# Patient Record
Sex: MALE | Race: WHITE | NOT HISPANIC OR LATINO | Employment: FULL TIME | URBAN - NONMETROPOLITAN AREA
[De-identification: names, ages, dates, MRNs, and addresses within clinical notes are randomized per-mention and may not be internally consistent; named-entity substitution may affect disease eponyms.]

---

## 2017-03-24 RX ORDER — RANITIDINE 150 MG/1
150 CAPSULE ORAL 2 TIMES DAILY
Qty: 60 CAPSULE | Refills: 2 | OUTPATIENT
Start: 2017-03-24 | End: 2017-05-31 | Stop reason: SDUPTHER

## 2017-04-28 ENCOUNTER — HOSPITAL ENCOUNTER (OUTPATIENT)
Dept: ULTRASOUND IMAGING | Facility: HOSPITAL | Age: 57
Discharge: HOME | End: 2017-04-28
Admitting: NURSE PRACTITIONER

## 2017-04-28 ENCOUNTER — TRANSCRIBE ORDERS (OUTPATIENT)
Dept: ADMINISTRATIVE | Facility: HOSPITAL | Age: 57
End: 2017-04-28

## 2017-04-28 DIAGNOSIS — R60.1 GENERALIZED EDEMA: ICD-10-CM

## 2017-04-28 DIAGNOSIS — R60.1 GENERALIZED EDEMA: Primary | ICD-10-CM

## 2017-04-28 PROCEDURE — 93971 EXTREMITY STUDY: CPT | Performed by: RADIOLOGY

## 2017-04-28 PROCEDURE — 93971 EXTREMITY STUDY: CPT

## 2017-05-01 ENCOUNTER — OFFICE VISIT (OUTPATIENT)
Dept: CARDIOLOGY | Facility: CLINIC | Age: 57
End: 2017-05-01

## 2017-05-01 VITALS
WEIGHT: 215 LBS | HEART RATE: 61 BPM | BODY MASS INDEX: 30.78 KG/M2 | HEIGHT: 70 IN | SYSTOLIC BLOOD PRESSURE: 106 MMHG | DIASTOLIC BLOOD PRESSURE: 66 MMHG | RESPIRATION RATE: 16 BRPM

## 2017-05-01 DIAGNOSIS — I25.10 ASCVD (ARTERIOSCLEROTIC CARDIOVASCULAR DISEASE): Primary | ICD-10-CM

## 2017-05-01 DIAGNOSIS — E78.5 DYSLIPIDEMIA: ICD-10-CM

## 2017-05-01 DIAGNOSIS — E11.9 TYPE 2 DIABETES MELLITUS WITHOUT COMPLICATION, WITHOUT LONG-TERM CURRENT USE OF INSULIN (HCC): ICD-10-CM

## 2017-05-01 PROCEDURE — 93000 ELECTROCARDIOGRAM COMPLETE: CPT | Performed by: PHYSICIAN ASSISTANT

## 2017-05-01 PROCEDURE — 99213 OFFICE O/P EST LOW 20 MIN: CPT | Performed by: PHYSICIAN ASSISTANT

## 2017-05-01 RX ORDER — GLIPIZIDE 5 MG/1
5 TABLET ORAL
COMMUNITY

## 2017-05-30 RX ORDER — CLOPIDOGREL BISULFATE 75 MG/1
75 TABLET ORAL DAILY
Qty: 30 TABLET | Refills: 5 | Status: SHIPPED | OUTPATIENT
Start: 2017-05-30 | End: 2018-01-09 | Stop reason: SDUPTHER

## 2017-05-31 RX ORDER — RANITIDINE 150 MG/1
150 CAPSULE ORAL 2 TIMES DAILY
Qty: 60 CAPSULE | Refills: 2 | OUTPATIENT
Start: 2017-05-31 | End: 2017-07-20 | Stop reason: SDUPTHER

## 2017-06-12 ENCOUNTER — LAB (OUTPATIENT)
Dept: LAB | Facility: HOSPITAL | Age: 57
End: 2017-06-12

## 2017-06-12 DIAGNOSIS — E78.5 DYSLIPIDEMIA: ICD-10-CM

## 2017-06-12 LAB
ALBUMIN SERPL-MCNC: 4.3 G/DL (ref 3.5–5)
ALBUMIN/GLOB SERPL: 1.4 G/DL (ref 1.5–2.5)
ALP SERPL-CCNC: 113 U/L (ref 40–129)
ALT SERPL W P-5'-P-CCNC: 23 U/L (ref 10–44)
ANION GAP SERPL CALCULATED.3IONS-SCNC: 5.3 MMOL/L (ref 3.6–11.2)
AST SERPL-CCNC: 19 U/L (ref 10–34)
BILIRUB SERPL-MCNC: 0.3 MG/DL (ref 0.2–1.8)
BUN BLD-MCNC: 12 MG/DL (ref 7–21)
BUN/CREAT SERPL: 15 (ref 7–25)
CALCIUM SPEC-SCNC: 9.4 MG/DL (ref 7.7–10)
CHLORIDE SERPL-SCNC: 106 MMOL/L (ref 99–112)
CHOLEST SERPL-MCNC: 104 MG/DL (ref 0–200)
CO2 SERPL-SCNC: 25.7 MMOL/L (ref 24.3–31.9)
CREAT BLD-MCNC: 0.8 MG/DL (ref 0.43–1.29)
GFR SERPL CREATININE-BSD FRML MDRD: 101 ML/MIN/1.73
GLOBULIN UR ELPH-MCNC: 3.1 GM/DL
GLUCOSE BLD-MCNC: 200 MG/DL (ref 70–110)
HDLC SERPL-MCNC: 33 MG/DL (ref 60–100)
LDLC SERPL CALC-MCNC: 39 MG/DL (ref 0–100)
LDLC/HDLC SERPL: 1.17 {RATIO}
OSMOLALITY SERPL CALC.SUM OF ELEC: 279.2 MOSM/KG (ref 273–305)
POTASSIUM BLD-SCNC: 4.4 MMOL/L (ref 3.5–5.3)
PROT SERPL-MCNC: 7.4 G/DL (ref 6–8)
SODIUM BLD-SCNC: 137 MMOL/L (ref 135–153)
TRIGL SERPL-MCNC: 162 MG/DL (ref 0–150)
VLDLC SERPL-MCNC: 32.4 MG/DL

## 2017-06-12 PROCEDURE — 36415 COLL VENOUS BLD VENIPUNCTURE: CPT

## 2017-06-12 PROCEDURE — 80061 LIPID PANEL: CPT | Performed by: PHYSICIAN ASSISTANT

## 2017-06-12 PROCEDURE — 80053 COMPREHEN METABOLIC PANEL: CPT | Performed by: PHYSICIAN ASSISTANT

## 2017-07-20 RX ORDER — RANITIDINE 150 MG/1
150 CAPSULE ORAL 2 TIMES DAILY
Qty: 60 CAPSULE | Refills: 2 | OUTPATIENT
Start: 2017-07-20 | End: 2017-07-24 | Stop reason: SDUPTHER

## 2017-07-24 ENCOUNTER — TELEPHONE (OUTPATIENT)
Dept: CARDIOLOGY | Facility: CLINIC | Age: 57
End: 2017-07-24

## 2017-07-24 RX ORDER — LISINOPRIL 5 MG/1
5 TABLET ORAL DAILY
Qty: 30 TABLET | Refills: 4 | Status: SHIPPED | OUTPATIENT
Start: 2017-07-24 | End: 2018-01-09 | Stop reason: SDUPTHER

## 2017-07-24 RX ORDER — RANITIDINE 150 MG/1
150 CAPSULE ORAL 2 TIMES DAILY
Qty: 60 CAPSULE | Refills: 4 | OUTPATIENT
Start: 2017-07-24 | End: 2017-07-26 | Stop reason: SDUPTHER

## 2017-07-26 RX ORDER — RANITIDINE 150 MG/1
150 CAPSULE ORAL 2 TIMES DAILY
Qty: 60 CAPSULE | Refills: 4 | OUTPATIENT
Start: 2017-07-26 | End: 2018-04-18 | Stop reason: SDUPTHER

## 2017-08-01 ENCOUNTER — TELEPHONE (OUTPATIENT)
Dept: CARDIOLOGY | Facility: CLINIC | Age: 57
End: 2017-08-01

## 2017-08-01 NOTE — TELEPHONE ENCOUNTER
Pt's wife called and stated that she has been trying to get Yonatan Zantac called in but the pharmacy has stated that they didn't receive it. I advised her that we had sent it in 2 times. I advised her that I will call the pharmacy and call them. She expressed understanding.   Called and spoke with Garrison called in Zantac 150 mg BID #6o with 4 RFS.

## 2017-10-27 ENCOUNTER — HOSPITAL ENCOUNTER (EMERGENCY)
Facility: HOSPITAL | Age: 57
Discharge: HOME | End: 2017-10-27
Attending: EMERGENCY MEDICINE | Admitting: EMERGENCY MEDICINE

## 2017-10-27 ENCOUNTER — APPOINTMENT (OUTPATIENT)
Dept: CT IMAGING | Facility: HOSPITAL | Age: 57
End: 2017-10-27

## 2017-10-27 VITALS
WEIGHT: 230 LBS | BODY MASS INDEX: 32.93 KG/M2 | TEMPERATURE: 98.7 F | RESPIRATION RATE: 16 BRPM | SYSTOLIC BLOOD PRESSURE: 105 MMHG | OXYGEN SATURATION: 96 % | DIASTOLIC BLOOD PRESSURE: 67 MMHG | HEART RATE: 77 BPM | HEIGHT: 70 IN

## 2017-10-27 DIAGNOSIS — K62.89 RECTAL MASS: Primary | ICD-10-CM

## 2017-10-27 DIAGNOSIS — N28.89 RIGHT RENAL MASS: ICD-10-CM

## 2017-10-27 LAB
ALBUMIN SERPL-MCNC: 4.7 G/DL (ref 3.5–5)
ALBUMIN/GLOB SERPL: 1.5 G/DL (ref 1.5–2.5)
ALP SERPL-CCNC: 100 U/L (ref 40–129)
ALT SERPL W P-5'-P-CCNC: 27 U/L (ref 10–44)
AMYLASE SERPL-CCNC: 27 U/L (ref 28–100)
ANION GAP SERPL CALCULATED.3IONS-SCNC: 5.8 MMOL/L (ref 3.6–11.2)
AST SERPL-CCNC: 19 U/L (ref 10–34)
BACTERIA UR QL AUTO: NORMAL /HPF
BILIRUB SERPL-MCNC: 0.5 MG/DL (ref 0.2–1.8)
BILIRUB UR QL STRIP: NEGATIVE
BUN BLD-MCNC: 17 MG/DL (ref 7–21)
BUN/CREAT SERPL: 18.9 (ref 7–25)
CALCIUM SPEC-SCNC: 9.7 MG/DL (ref 7.7–10)
CHLORIDE SERPL-SCNC: 103 MMOL/L (ref 99–112)
CLARITY UR: CLEAR
CO2 SERPL-SCNC: 28.2 MMOL/L (ref 24.3–31.9)
COLOR UR: ABNORMAL
CREAT BLD-MCNC: 0.9 MG/DL (ref 0.43–1.29)
DEPRECATED RDW RBC AUTO: 42 FL (ref 37–54)
EOSINOPHIL # BLD MANUAL: 0.13 10*3/MM3 (ref 0–0.7)
EOSINOPHIL NFR BLD MANUAL: 1 % (ref 0–5)
ERYTHROCYTE [DISTWIDTH] IN BLOOD BY AUTOMATED COUNT: 12.4 % (ref 11.5–14.5)
GFR SERPL CREATININE-BSD FRML MDRD: 88 ML/MIN/1.73
GLOBULIN UR ELPH-MCNC: 3.2 GM/DL
GLUCOSE BLD-MCNC: 165 MG/DL (ref 70–110)
GLUCOSE UR STRIP-MCNC: ABNORMAL MG/DL
HCT VFR BLD AUTO: 40 % (ref 42–52)
HGB BLD-MCNC: 13.7 G/DL (ref 14–18)
HGB UR QL STRIP.AUTO: NEGATIVE
HYALINE CASTS UR QL AUTO: NORMAL /LPF
KETONES UR QL STRIP: ABNORMAL
LEUKOCYTE ESTERASE UR QL STRIP.AUTO: ABNORMAL
LIPASE SERPL-CCNC: 35 U/L (ref 13–60)
LYMPHOCYTES # BLD MANUAL: 4.71 10*3/MM3 (ref 1–3)
LYMPHOCYTES NFR BLD MANUAL: 37 % (ref 21–51)
LYMPHOCYTES NFR BLD MANUAL: 4 % (ref 0–10)
MCH RBC QN AUTO: 31.5 PG (ref 27–33)
MCHC RBC AUTO-ENTMCNC: 34.3 G/DL (ref 33–37)
MCV RBC AUTO: 92 FL (ref 80–94)
MONOCYTES # BLD AUTO: 0.51 10*3/MM3 (ref 0.1–0.9)
NEUTROPHILS # BLD AUTO: 7.39 10*3/MM3 (ref 1.4–6.5)
NEUTROPHILS NFR BLD MANUAL: 58 % (ref 30–70)
NITRITE UR QL STRIP: NEGATIVE
OSMOLALITY SERPL CALC.SUM OF ELEC: 279.1 MOSM/KG (ref 273–305)
PH UR STRIP.AUTO: <=5 [PH] (ref 5–8)
PLAT MORPH BLD: NORMAL
PLATELET # BLD AUTO: 291 10*3/MM3 (ref 130–400)
PMV BLD AUTO: 9.3 FL (ref 6–10)
POTASSIUM BLD-SCNC: 4.1 MMOL/L (ref 3.5–5.3)
PROT SERPL-MCNC: 7.9 G/DL (ref 6–8)
PROT UR QL STRIP: NEGATIVE
RBC # BLD AUTO: 4.35 10*6/MM3 (ref 4.7–6.1)
RBC # UR: NORMAL /HPF
RBC MORPH BLD: NORMAL
REF LAB TEST METHOD: NORMAL
SCAN SLIDE: NORMAL
SODIUM BLD-SCNC: 137 MMOL/L (ref 135–153)
SP GR UR STRIP: >=1.03 (ref 1–1.03)
SQUAMOUS #/AREA URNS HPF: NORMAL /HPF
UROBILINOGEN UR QL STRIP: ABNORMAL
WBC NRBC COR # BLD: 12.74 10*3/MM3 (ref 4.5–12.5)
WBC UR QL AUTO: NORMAL /HPF

## 2017-10-27 PROCEDURE — 0 IOPAMIDOL 61 % SOLUTION: Performed by: EMERGENCY MEDICINE

## 2017-10-27 PROCEDURE — 82150 ASSAY OF AMYLASE: CPT | Performed by: NURSE PRACTITIONER

## 2017-10-27 PROCEDURE — 80053 COMPREHEN METABOLIC PANEL: CPT | Performed by: NURSE PRACTITIONER

## 2017-10-27 PROCEDURE — 74177 CT ABD & PELVIS W/CONTRAST: CPT

## 2017-10-27 PROCEDURE — 85025 COMPLETE CBC W/AUTO DIFF WBC: CPT | Performed by: NURSE PRACTITIONER

## 2017-10-27 PROCEDURE — 74177 CT ABD & PELVIS W/CONTRAST: CPT | Performed by: RADIOLOGY

## 2017-10-27 PROCEDURE — 85007 BL SMEAR W/DIFF WBC COUNT: CPT | Performed by: NURSE PRACTITIONER

## 2017-10-27 PROCEDURE — 83690 ASSAY OF LIPASE: CPT | Performed by: NURSE PRACTITIONER

## 2017-10-27 PROCEDURE — 81001 URINALYSIS AUTO W/SCOPE: CPT | Performed by: NURSE PRACTITIONER

## 2017-10-27 PROCEDURE — 99283 EMERGENCY DEPT VISIT LOW MDM: CPT

## 2017-10-27 RX ORDER — SODIUM CHLORIDE 0.9 % (FLUSH) 0.9 %
10 SYRINGE (ML) INJECTION AS NEEDED
Status: DISCONTINUED | OUTPATIENT
Start: 2017-10-27 | End: 2017-10-27 | Stop reason: HOSPADM

## 2017-10-27 RX ADMIN — IOPAMIDOL 100 ML: 612 INJECTION, SOLUTION INTRAVENOUS at 17:17

## 2017-10-27 NOTE — ED NOTES
Patient requesting to go outside and smoke, patient informed of  smoking policy, patient verbalized understanding. Patient requesting to have IV removed so he can go smoke, KASHMIR Flores removed IV, patient outside smoking.      Yudi Girard RN  10/27/17 [id number]

## 2017-10-27 NOTE — ED PROVIDER NOTES
Subjective   Patient is a 57 y.o. male presenting with abdominal pain.   History provided by:  Patient  Abdominal Pain   Pain location:  Suprapubic  Pain quality: aching, sharp and shooting    Pain severity:  Moderate  Timing:  Intermittent  Progression:  Waxing and waning  Chronicity:  New  Relieved by:  None tried  Worsened by:  Nothing  Ineffective treatments:  None tried  Associated symptoms: diarrhea and dysuria    Associated symptoms: no chest pain and no fever        Review of Systems   Constitutional: Negative.  Negative for fever.   HENT: Negative.    Respiratory: Negative.    Cardiovascular: Negative.  Negative for chest pain.   Gastrointestinal: Positive for abdominal pain and diarrhea.   Endocrine: Negative.    Genitourinary: Positive for dysuria.   Skin: Negative.    Neurological: Negative.    Psychiatric/Behavioral: Negative.    All other systems reviewed and are negative.      Past Medical History:   Diagnosis Date   • Asthma    • Diabetes mellitus     Borderline        No Known Allergies    Past Surgical History:   Procedure Laterality Date   • APPENDECTOMY      30years ago    • CARDIAC CATHETERIZATION Right 7/27/2016    Procedure: Left Heart Cath;  Surgeon: Chin Kathleen MD;  Location:  [facility];  Service:    • IL RT/LT HEART CATHETERS N/A 7/27/2016    Procedure: Percutaneous Coronary Intervention;  Surgeon: Chin Kathleen MD;  Location:  [facility];  Service: Cardiovascular   • IL RT/LT HEART CATHETERS N/A 7/27/2016    Procedure: Percutaneous Coronary Intervention;  Surgeon: Garrison Coleman DO;  Location:  [facility];  Service: Cardiovascular       Family History   Problem Relation Age of Onset   • Stroke Mother    • Heart disease Mother    • Heart disease Father    • Lymphoma Father        Social History     Social History   • Marital status:      Spouse name: N/A   • Number of children: N/A   • Years of education: N/A     Social  History Main Topics   • Smoking status: Current Every Day Smoker     Packs/day: 1.50     Years: 35.00     Types: Cigarettes   • Smokeless tobacco: None   • Alcohol use No   • Drug use: No   • Sexual activity: Defer     Other Topics Concern   • None     Social History Narrative           Objective   Physical Exam   Constitutional: He is oriented to person, place, and time. He appears well-developed and well-nourished. No distress.   HENT:   Head: Normocephalic and atraumatic.   Right Ear: External ear normal.   Left Ear: External ear normal.   Nose: Nose normal.   Eyes: Conjunctivae and EOM are normal. Pupils are equal, round, and reactive to light.   Neck: Normal range of motion. Neck supple. No JVD present. No tracheal deviation present.   Cardiovascular: Normal rate, regular rhythm and normal heart sounds.    No murmur heard.  Pulmonary/Chest: Effort normal and breath sounds normal. No respiratory distress. He has no wheezes.   Abdominal: Soft. Bowel sounds are normal. There is tenderness.   Musculoskeletal: Normal range of motion. He exhibits no edema or deformity.   Neurological: He is alert and oriented to person, place, and time. No cranial nerve deficit.   Skin: Skin is warm and dry. No rash noted. He is not diaphoretic. No erythema. No pallor.   Psychiatric: He has a normal mood and affect. His behavior is normal. Thought content normal.   Nursing note and vitals reviewed.      Procedures         ED Course  ED Course   Comment By Time   Discussed the results of the CT scan in depth with patient and wife.  Stressed the importance of follow-up ASAP with GI and PCP.  Verbalized understanding. Mandie Flores, APRN 10/27 9753                  MDM  Number of Diagnoses or Management Options  Rectal mass: new and requires workup  Right renal mass: new and requires workup     Amount and/or Complexity of Data Reviewed  Clinical lab tests: reviewed  Tests in the radiology section of CPT®: reviewed    Risk of  Complications, Morbidity, and/or Mortality  Presenting problems: low  Diagnostic procedures: low    Patient Progress  Patient progress: stable      Final diagnoses:   Rectal mass   Right renal mass            Mandie Flores, APRN  10/27/17 2618

## 2017-10-27 NOTE — ED NOTES
Patient reports lower abd pain for past several days, patient reports cramping and pain with urination. Patient reports normal prostate exam 3 years ago.      Yudi Girard RN  10/27/17 0596

## 2017-11-01 ENCOUNTER — HOSPITAL ENCOUNTER (EMERGENCY)
Facility: HOSPITAL | Age: 57
Discharge: ANOTHER HOSPITAL | End: 2017-11-01
Attending: FAMILY MEDICINE | Admitting: FAMILY MEDICINE

## 2017-11-01 ENCOUNTER — APPOINTMENT (OUTPATIENT)
Dept: GENERAL RADIOLOGY | Facility: HOSPITAL | Age: 57
End: 2017-11-01

## 2017-11-01 ENCOUNTER — APPOINTMENT (OUTPATIENT)
Dept: CT IMAGING | Facility: HOSPITAL | Age: 57
End: 2017-11-01

## 2017-11-01 DIAGNOSIS — D72.829 LEUKOCYTOSIS, UNSPECIFIED TYPE: Primary | ICD-10-CM

## 2017-11-01 DIAGNOSIS — C19 COLORECTAL CANCER (HCC): ICD-10-CM

## 2017-11-01 LAB
ALBUMIN SERPL-MCNC: 4.7 G/DL (ref 3.5–5)
ALBUMIN/GLOB SERPL: 1.3 G/DL (ref 1.5–2.5)
ALP SERPL-CCNC: 133 U/L (ref 40–129)
ALT SERPL W P-5'-P-CCNC: 36 U/L (ref 10–44)
ANION GAP SERPL CALCULATED.3IONS-SCNC: 10.6 MMOL/L (ref 3.6–11.2)
AST SERPL-CCNC: 22 U/L (ref 10–34)
BASOPHILS # BLD AUTO: 0.03 10*3/MM3 (ref 0–0.3)
BASOPHILS NFR BLD AUTO: 0.2 % (ref 0–2)
BILIRUB SERPL-MCNC: 0.5 MG/DL (ref 0.2–1.8)
BILIRUB UR QL STRIP: NEGATIVE
BUN BLD-MCNC: 14 MG/DL (ref 7–21)
BUN/CREAT SERPL: 17.3 (ref 7–25)
CALCIUM SPEC-SCNC: 9.7 MG/DL (ref 7.7–10)
CHLORIDE SERPL-SCNC: 100 MMOL/L (ref 99–112)
CLARITY UR: CLEAR
CO2 SERPL-SCNC: 23.4 MMOL/L (ref 24.3–31.9)
COLOR UR: YELLOW
CREAT BLD-MCNC: 0.81 MG/DL (ref 0.43–1.29)
D-LACTATE SERPL-SCNC: 2.3 MMOL/L (ref 0.5–2)
DEPRECATED RDW RBC AUTO: 39.8 FL (ref 37–54)
EOSINOPHIL # BLD AUTO: 0.01 10*3/MM3 (ref 0–0.7)
EOSINOPHIL NFR BLD AUTO: 0.1 % (ref 0–5)
ERYTHROCYTE [DISTWIDTH] IN BLOOD BY AUTOMATED COUNT: 12.1 % (ref 11.5–14.5)
GFR SERPL CREATININE-BSD FRML MDRD: 99 ML/MIN/1.73
GLOBULIN UR ELPH-MCNC: 3.5 GM/DL
GLUCOSE BLD-MCNC: 195 MG/DL (ref 70–110)
GLUCOSE UR STRIP-MCNC: ABNORMAL MG/DL
HCT VFR BLD AUTO: 42.8 % (ref 42–52)
HGB BLD-MCNC: 14.7 G/DL (ref 14–18)
HGB UR QL STRIP.AUTO: NEGATIVE
HOLD SPECIMEN: NORMAL
IMM GRANULOCYTES # BLD: 0.07 10*3/MM3 (ref 0–0.03)
IMM GRANULOCYTES NFR BLD: 0.4 % (ref 0–0.5)
KETONES UR QL STRIP: NEGATIVE
LEUKOCYTE ESTERASE UR QL STRIP.AUTO: NEGATIVE
LIPASE SERPL-CCNC: 32 U/L (ref 13–60)
LYMPHOCYTES # BLD AUTO: 1.54 10*3/MM3 (ref 1–3)
LYMPHOCYTES NFR BLD AUTO: 8.2 % (ref 21–51)
MCH RBC QN AUTO: 30.9 PG (ref 27–33)
MCHC RBC AUTO-ENTMCNC: 34.3 G/DL (ref 33–37)
MCV RBC AUTO: 90.1 FL (ref 80–94)
MONOCYTES # BLD AUTO: 1.91 10*3/MM3 (ref 0.1–0.9)
MONOCYTES NFR BLD AUTO: 10.2 % (ref 0–10)
NEUTROPHILS # BLD AUTO: 15.24 10*3/MM3 (ref 1.4–6.5)
NEUTROPHILS NFR BLD AUTO: 80.9 % (ref 30–70)
NITRITE UR QL STRIP: NEGATIVE
OSMOLALITY SERPL CALC.SUM OF ELEC: 274.1 MOSM/KG (ref 273–305)
PH UR STRIP.AUTO: 5.5 [PH] (ref 5–8)
PLATELET # BLD AUTO: 313 10*3/MM3 (ref 130–400)
PMV BLD AUTO: 9.1 FL (ref 6–10)
POTASSIUM BLD-SCNC: 3.9 MMOL/L (ref 3.5–5.3)
PROT SERPL-MCNC: 8.2 G/DL (ref 6–8)
PROT UR QL STRIP: NEGATIVE
RBC # BLD AUTO: 4.75 10*6/MM3 (ref 4.7–6.1)
SODIUM BLD-SCNC: 134 MMOL/L (ref 135–153)
SP GR UR STRIP: 1.02 (ref 1–1.03)
UROBILINOGEN UR QL STRIP: ABNORMAL
WBC NRBC COR # BLD: 18.8 10*3/MM3 (ref 4.5–12.5)

## 2017-11-01 PROCEDURE — 96365 THER/PROPH/DIAG IV INF INIT: CPT

## 2017-11-01 PROCEDURE — 80053 COMPREHEN METABOLIC PANEL: CPT | Performed by: FAMILY MEDICINE

## 2017-11-01 PROCEDURE — 83605 ASSAY OF LACTIC ACID: CPT | Performed by: FAMILY MEDICINE

## 2017-11-01 PROCEDURE — 81003 URINALYSIS AUTO W/O SCOPE: CPT | Performed by: FAMILY MEDICINE

## 2017-11-01 PROCEDURE — 71010 XR CHEST 1 VW: CPT | Performed by: RADIOLOGY

## 2017-11-01 PROCEDURE — 25010000002 ONDANSETRON PER 1 MG: Performed by: FAMILY MEDICINE

## 2017-11-01 PROCEDURE — 25010000002 ONDANSETRON PER 1 MG

## 2017-11-01 PROCEDURE — 25010000002 HYDROMORPHONE PER 4 MG: Performed by: FAMILY MEDICINE

## 2017-11-01 PROCEDURE — 96375 TX/PRO/DX INJ NEW DRUG ADDON: CPT

## 2017-11-01 PROCEDURE — 25010000002 VANCOMYCIN PER 500 MG: Performed by: FAMILY MEDICINE

## 2017-11-01 PROCEDURE — 71010 HC CHEST PA OR AP: CPT

## 2017-11-01 PROCEDURE — 99284 EMERGENCY DEPT VISIT MOD MDM: CPT

## 2017-11-01 PROCEDURE — 25010000002 PIPERACILLIN-TAZOBACTAM: Performed by: FAMILY MEDICINE

## 2017-11-01 PROCEDURE — 85025 COMPLETE CBC W/AUTO DIFF WBC: CPT | Performed by: FAMILY MEDICINE

## 2017-11-01 PROCEDURE — 96368 THER/DIAG CONCURRENT INF: CPT

## 2017-11-01 PROCEDURE — 74176 CT ABD & PELVIS W/O CONTRAST: CPT | Performed by: RADIOLOGY

## 2017-11-01 PROCEDURE — 74176 CT ABD & PELVIS W/O CONTRAST: CPT

## 2017-11-01 PROCEDURE — 96366 THER/PROPH/DIAG IV INF ADDON: CPT

## 2017-11-01 PROCEDURE — 96376 TX/PRO/DX INJ SAME DRUG ADON: CPT

## 2017-11-01 PROCEDURE — 87040 BLOOD CULTURE FOR BACTERIA: CPT | Performed by: FAMILY MEDICINE

## 2017-11-01 PROCEDURE — 36415 COLL VENOUS BLD VENIPUNCTURE: CPT

## 2017-11-01 PROCEDURE — 83690 ASSAY OF LIPASE: CPT | Performed by: FAMILY MEDICINE

## 2017-11-01 PROCEDURE — 96361 HYDRATE IV INFUSION ADD-ON: CPT

## 2017-11-01 PROCEDURE — 25010000002 HYDROMORPHONE PER 4 MG

## 2017-11-01 RX ORDER — ONDANSETRON 2 MG/ML
4 INJECTION INTRAMUSCULAR; INTRAVENOUS ONCE
Status: COMPLETED | OUTPATIENT
Start: 2017-11-01 | End: 2017-11-01

## 2017-11-01 RX ORDER — ONDANSETRON 2 MG/ML
INJECTION INTRAMUSCULAR; INTRAVENOUS
Status: COMPLETED
Start: 2017-11-01 | End: 2017-11-01

## 2017-11-01 RX ORDER — HYDROMORPHONE HYDROCHLORIDE 1 MG/ML
0.5 INJECTION, SOLUTION INTRAMUSCULAR; INTRAVENOUS; SUBCUTANEOUS ONCE
Status: DISCONTINUED | OUTPATIENT
Start: 2017-11-01 | End: 2017-11-01 | Stop reason: HOSPADM

## 2017-11-01 RX ORDER — HYDROMORPHONE HYDROCHLORIDE 1 MG/ML
0.5 INJECTION, SOLUTION INTRAMUSCULAR; INTRAVENOUS; SUBCUTANEOUS ONCE
Status: COMPLETED | OUTPATIENT
Start: 2017-11-01 | End: 2017-11-01

## 2017-11-01 RX ORDER — SODIUM CHLORIDE 0.9 % (FLUSH) 0.9 %
10 SYRINGE (ML) INJECTION AS NEEDED
Status: DISCONTINUED | OUTPATIENT
Start: 2017-11-01 | End: 2017-11-01 | Stop reason: HOSPADM

## 2017-11-01 RX ADMIN — SODIUM CHLORIDE 1000 ML: 900 INJECTION, SOLUTION INTRAVENOUS at 14:57

## 2017-11-01 RX ADMIN — HYDROMORPHONE HYDROCHLORIDE 0.5 MG: 1 INJECTION, SOLUTION INTRAMUSCULAR; INTRAVENOUS; SUBCUTANEOUS at 17:56

## 2017-11-01 RX ADMIN — HYDROMORPHONE HYDROCHLORIDE 0.5 MG: 1 INJECTION, SOLUTION INTRAMUSCULAR; INTRAVENOUS; SUBCUTANEOUS at 20:14

## 2017-11-01 RX ADMIN — HYDROMORPHONE HYDROCHLORIDE 0.5 MG: 1 INJECTION, SOLUTION INTRAMUSCULAR; INTRAVENOUS; SUBCUTANEOUS at 14:58

## 2017-11-01 RX ADMIN — PIPERACILLIN SODIUM AND TAZOBACTAM SODIUM 4.5 G: 4; .5 INJECTION, POWDER, FOR SOLUTION INTRAVENOUS at 15:52

## 2017-11-01 RX ADMIN — ONDANSETRON 4 MG: 2 INJECTION, SOLUTION INTRAMUSCULAR; INTRAVENOUS at 20:14

## 2017-11-01 RX ADMIN — ONDANSETRON 4 MG: 2 INJECTION, SOLUTION INTRAMUSCULAR; INTRAVENOUS at 17:55

## 2017-11-01 RX ADMIN — HYDROMORPHONE HYDROCHLORIDE 0.5 MG: 1 INJECTION, SOLUTION INTRAMUSCULAR; INTRAVENOUS; SUBCUTANEOUS at 16:52

## 2017-11-01 RX ADMIN — VANCOMYCIN HYDROCHLORIDE 2000 MG: 5 INJECTION, POWDER, LYOPHILIZED, FOR SOLUTION INTRAVENOUS at 15:41

## 2017-11-01 RX ADMIN — ONDANSETRON 4 MG: 2 INJECTION, SOLUTION INTRAMUSCULAR; INTRAVENOUS at 14:57

## 2017-11-01 RX ADMIN — ONDANSETRON 4 MG: 2 INJECTION INTRAMUSCULAR; INTRAVENOUS at 20:14

## 2017-11-01 NOTE — ED NOTES
Pt resting quietly on stretcher with no complaints.  Pt AAOx4 with no resp distress noted, respirations even and unlabored.  Pt denies any needs at this time.  Skin PWD.  Pt family at bedside. Will continue to monitor and follow plan of care.  Bed rails up x2, bed in lowest position, call light in reach.       Muriel Naqvi RN  11/01/17 3354

## 2017-11-01 NOTE — ED NOTES
Pt resting quietly on stretcher with improving complaint of pain.  Pt AAOx4 with no resp distress noted, respirations even and unlabored.  Pt denies any needs at this time.  Skin PWD.  Pt family at bedside. Will continue to monitor and follow plan of care.  Bed rails up x2, bed in lowest position, call light in reach.       Muriel Naqvi RN  11/01/17 6455

## 2017-11-01 NOTE — ED NOTES
Pt reports that he is beginning to become sweaty and having pain at this time.  MD notified.     Muriel Naqvi RN  11/01/17 8525

## 2017-11-01 NOTE — ED NOTES
Pt resting quietly on stretcher with no complaints.  Pt AAOx4 with no resp distress noted, respirations even and unlabored.  Pt denies any needs at this time.  Skin PWD.  Pt family at bedside. Will continue to monitor and follow plan of care.  Bed rails up x2, bed in lowest position, call light in reach.       Muriel Naqvi RN  11/01/17 7752

## 2017-11-01 NOTE — ED NOTES
Pt reports that he was recently diagnosed on Oct 27th with colorectal cancer and also has a mass on his right kidney and 2 nodules in his chest.  Pt reports that he has been having increasing lower abd/pelvic pain and pressure and pain in his rectal region.  Pt reports that he has been having pain and running a fever up to 102-103 at times and having sweats.  Pt family at bedside.     Muriel Naqvi RN  11/01/17 4147

## 2017-11-01 NOTE — ED NOTES
Pt resting quietly on stretcher with no complaints.  Pt AAOx4 with no resp distress noted, respirations even and unlabored.  Pt denies any needs at this time.  Skin PWD.  Pt family at bedside. Will continue to monitor and follow plan of care.  Bed rails up x2, bed in lowest position, call light in reach.       Muriel Naqvi RN  11/01/17 1696

## 2017-11-01 NOTE — ED PROVIDER NOTES
Subjective   History of Present Illness  58 y/o M w/h/o colorectal cancer that is currently being worked up and suspected to be metastatic p/w reported intermittent fevers at home. Pt states that his fever gets up to 102 at home.   Review of Systems   Constitutional: Positive for chills, fatigue and fever.   Eyes: Negative for photophobia and visual disturbance.   Respiratory: Positive for shortness of breath. Negative for cough, choking, chest tightness and wheezing.    Cardiovascular: Negative for chest pain and palpitations.   Gastrointestinal: Positive for abdominal pain. Negative for abdominal distention, constipation, diarrhea, nausea and vomiting.   Genitourinary: Negative for difficulty urinating and dysuria.   Musculoskeletal: Negative for back pain and neck pain.   Skin: Negative for color change and pallor.   All other systems reviewed and are negative.      Past Medical History:   Diagnosis Date   • Asthma    • Cancer    • Diabetes mellitus     Borderline        No Known Allergies    Past Surgical History:   Procedure Laterality Date   • APPENDECTOMY      30years ago    • CARDIAC CATHETERIZATION Right 7/27/2016    Procedure: Left Heart Cath;  Surgeon: Chin Kathleen MD;  Location:  [facility];  Service:    • MO RT/LT HEART CATHETERS N/A 7/27/2016    Procedure: Percutaneous Coronary Intervention;  Surgeon: Chin Kathleen MD;  Location:  [facility];  Service: Cardiovascular   • MO RT/LT HEART CATHETERS N/A 7/27/2016    Procedure: Percutaneous Coronary Intervention;  Surgeon: Garrison Coleman DO;  Location:  [facility];  Service: Cardiovascular       Family History   Problem Relation Age of Onset   • Stroke Mother    • Heart disease Mother    • Heart disease Father    • Lymphoma Father        Social History     Social History   • Marital status:      Spouse name: N/A   • Number of children: N/A   • Years of education: N/A     Social History Main  Topics   • Smoking status: Current Every Day Smoker     Packs/day: 1.50     Years: 35.00     Types: Cigarettes   • Smokeless tobacco: None   • Alcohol use No   • Drug use: No   • Sexual activity: Defer     Other Topics Concern   • None     Social History Narrative   • None           Objective   Physical Exam   Constitutional: He is oriented to person, place, and time. He appears well-developed and well-nourished. He is active.   HENT:   Head: Normocephalic and atraumatic.   Right Ear: Hearing and external ear normal.   Left Ear: Hearing and external ear normal.   Nose: Nose normal.   Mouth/Throat: Uvula is midline, oropharynx is clear and moist and mucous membranes are normal.   Eyes: Conjunctivae, EOM and lids are normal. Pupils are equal, round, and reactive to light.   Neck: Trachea normal, normal range of motion, full passive range of motion without pain and phonation normal. Neck supple.   Cardiovascular: Normal rate, regular rhythm and normal heart sounds.    Pulmonary/Chest: Effort normal and breath sounds normal.   Abdominal: Soft. Normal appearance. He exhibits distension. There is no tenderness.   Neurological: He is alert and oriented to person, place, and time. GCS eye subscore is 4. GCS verbal subscore is 5. GCS motor subscore is 6.   Skin: Skin is warm, dry and intact.   Psychiatric: He has a normal mood and affect. His speech is normal and behavior is normal. Cognition and memory are normal.   Nursing note and vitals reviewed.      Procedures         ED Course  ED Course      1700-discussed pt and CT findings with Dr Allison who agreed to accept pt to  ED.            MDM  Number of Diagnoses or Management Options  Colorectal cancer: new and requires workup  Leukocytosis, unspecified type: new and requires workup     Amount and/or Complexity of Data Reviewed  Clinical lab tests: reviewed and ordered  Tests in the radiology section of CPT®: reviewed and ordered  Decide to obtain previous medical records  or to obtain history from someone other than the patient: yes  Discuss the patient with other providers: yes  Independent visualization of images, tracings, or specimens: yes    Risk of Complications, Morbidity, and/or Mortality  Presenting problems: high  Diagnostic procedures: high  Management options: high    Patient Progress  Patient progress: stable      Final diagnoses:   Leukocytosis, unspecified type   Colorectal cancer            Nirav Ordoñez MD  11/01/17 0734

## 2017-11-01 NOTE — ED NOTES
Pt resting quietly on stretcher with continuing complaint of pain.  Pt AAOx4 with no resp distress noted, respirations even and unlabored.  Pt denies any needs at this time.  Skin PWD.  Pt family at bedside. Will continue to monitor and follow plan of care.  Bed rails up x2, bed in lowest position, call light in reach.       Muriel Naqvi RN  11/01/17 9570

## 2017-11-02 VITALS
HEART RATE: 89 BPM | BODY MASS INDEX: 32.93 KG/M2 | WEIGHT: 230 LBS | OXYGEN SATURATION: 96 % | TEMPERATURE: 98.7 F | HEIGHT: 70 IN | DIASTOLIC BLOOD PRESSURE: 73 MMHG | RESPIRATION RATE: 18 BRPM | SYSTOLIC BLOOD PRESSURE: 104 MMHG

## 2017-11-06 LAB
BACTERIA SPEC AEROBE CULT: NORMAL
BACTERIA SPEC AEROBE CULT: NORMAL

## 2017-12-07 ENCOUNTER — LAB (OUTPATIENT)
Dept: LAB | Facility: HOSPITAL | Age: 57
End: 2017-12-07

## 2017-12-07 ENCOUNTER — TRANSCRIBE ORDERS (OUTPATIENT)
Dept: ADMINISTRATIVE | Facility: HOSPITAL | Age: 57
End: 2017-12-07

## 2017-12-07 DIAGNOSIS — R19.7 DIARRHEA, UNSPECIFIED TYPE: ICD-10-CM

## 2017-12-07 DIAGNOSIS — R19.7 DIARRHEA, UNSPECIFIED TYPE: Primary | ICD-10-CM

## 2017-12-07 LAB
027 TOXIN: NORMAL
C DIFF TOX GENS STL QL NAA+PROBE: NEGATIVE

## 2017-12-07 PROCEDURE — 87493 C DIFF AMPLIFIED PROBE: CPT

## 2018-01-09 RX ORDER — LISINOPRIL 5 MG/1
5 TABLET ORAL DAILY
Qty: 30 TABLET | Refills: 2 | Status: SHIPPED | OUTPATIENT
Start: 2018-01-09 | End: 2018-01-29 | Stop reason: SDUPTHER

## 2018-01-09 RX ORDER — LISINOPRIL 5 MG/1
5 TABLET ORAL DAILY
Qty: 30 TABLET | Refills: 0 | Status: SHIPPED | OUTPATIENT
Start: 2018-01-09 | End: 2018-01-09 | Stop reason: SDUPTHER

## 2018-01-09 RX ORDER — CLOPIDOGREL BISULFATE 75 MG/1
75 TABLET ORAL DAILY
Qty: 30 TABLET | Refills: 2 | Status: SHIPPED | OUTPATIENT
Start: 2018-01-09 | End: 2018-01-29 | Stop reason: SDUPTHER

## 2018-01-09 RX ORDER — CLOPIDOGREL BISULFATE 75 MG/1
75 TABLET ORAL DAILY
Qty: 30 TABLET | Refills: 0 | Status: SHIPPED | OUTPATIENT
Start: 2018-01-09 | End: 2018-01-09 | Stop reason: SDUPTHER

## 2018-01-29 ENCOUNTER — OFFICE VISIT (OUTPATIENT)
Dept: CARDIOLOGY | Facility: CLINIC | Age: 58
End: 2018-01-29

## 2018-01-29 VITALS
HEART RATE: 57 BPM | WEIGHT: 213.2 LBS | BODY MASS INDEX: 30.52 KG/M2 | SYSTOLIC BLOOD PRESSURE: 107 MMHG | OXYGEN SATURATION: 96 % | DIASTOLIC BLOOD PRESSURE: 69 MMHG | HEIGHT: 70 IN

## 2018-01-29 DIAGNOSIS — C64.1 RENAL CARCINOMA, RIGHT (HCC): ICD-10-CM

## 2018-01-29 DIAGNOSIS — I25.10 ASCVD (ARTERIOSCLEROTIC CARDIOVASCULAR DISEASE): Primary | ICD-10-CM

## 2018-01-29 DIAGNOSIS — E78.5 DYSLIPIDEMIA: ICD-10-CM

## 2018-01-29 DIAGNOSIS — R07.2 PRECORDIAL PAIN: ICD-10-CM

## 2018-01-29 PROCEDURE — 99214 OFFICE O/P EST MOD 30 MIN: CPT | Performed by: INTERNAL MEDICINE

## 2018-01-29 PROCEDURE — 93000 ELECTROCARDIOGRAM COMPLETE: CPT | Performed by: INTERNAL MEDICINE

## 2018-01-29 RX ORDER — CLOPIDOGREL BISULFATE 75 MG/1
75 TABLET ORAL DAILY
Qty: 30 TABLET | Refills: 5 | Status: SHIPPED | OUTPATIENT
Start: 2018-01-29 | End: 2018-11-29 | Stop reason: SDUPTHER

## 2018-01-29 RX ORDER — LOPERAMIDE HYDROCHLORIDE 2 MG/1
2 CAPSULE ORAL 4 TIMES DAILY PRN
COMMUNITY

## 2018-01-29 RX ORDER — NICOTINE 21 MG/24HR
1 PATCH, TRANSDERMAL 24 HOURS TRANSDERMAL EVERY 24 HOURS
Qty: 30 PATCH | Refills: 2 | Status: SHIPPED | OUTPATIENT
Start: 2018-01-29

## 2018-01-29 RX ORDER — ATORVASTATIN CALCIUM 80 MG/1
80 TABLET, FILM COATED ORAL DAILY
Qty: 30 TABLET | Refills: 5 | Status: SHIPPED | OUTPATIENT
Start: 2018-01-29 | End: 2018-07-09 | Stop reason: SDUPTHER

## 2018-01-29 RX ORDER — LISINOPRIL 5 MG/1
5 TABLET ORAL DAILY
Qty: 30 TABLET | Refills: 5 | Status: SHIPPED | OUTPATIENT
Start: 2018-01-29 | End: 2019-11-14 | Stop reason: SDUPTHER

## 2018-01-29 NOTE — PROGRESS NOTES
Garrison Hayes MD  Yury Thibodeaux  1960 01/29/2018    Patient Active Problem List   Diagnosis   • Precordial pain   • Dyslipidemia   • Palpitations   • ASCVD (arteriosclerotic cardiovascular disease), status post stenting of the left circumflex with 2.5×8 mm Alpine stent on 7/27/2016.   • Renal carcinoma, right, status post right partial nephrectomy in November 2017.       Dear Garrison Hayes MD:    Subjective     Chief complaint: Follow-up of ASCVD.      History of Present Illness: Ms. Thibodeaux is a pleasant 56-year-old  male with history of ASCVD, status post stenting of the mid and distal left circumflex in July 2016, is here for a regular cardiac follow-up.  He has some occasional mild chest pains that seem to come and go spontaneously.  He underwent  surgery for cancer of the right kidney with Reportedly partial nephrectomy and removal of the right adrenal gland in November 2017.  He has some intermittent shortness of breath.  He has no PND, orthopnea or pedal edema.  He continues to smoke about a pack a day.    No Known Allergies:      Current Outpatient Prescriptions:   •  aspirin  MG tablet, Take 1 tablet by mouth Daily., Disp: 30 tablet, Rfl: 5  •  atorvastatin (LIPITOR) 80 MG tablet, Take 1 tablet by mouth Daily., Disp: 30 tablet, Rfl: 5  •  clopidogrel (PLAVIX) 75 MG tablet, Take 1 tablet by mouth Daily., Disp: 30 tablet, Rfl: 5  •  glipiZIDE (GLUCOTROL) 5 MG tablet, Take 5 mg by mouth 2 (Two) Times a Day Before Meals., Disp: , Rfl:   •  lisinopril (PRINIVIL,ZESTRIL) 5 MG tablet, Take 1 tablet by mouth Daily., Disp: 30 tablet, Rfl: 5  •  loperamide (IMODIUM) 2 MG capsule, Take 2 mg by mouth 4 (Four) Times a Day As Needed for Diarrhea., Disp: , Rfl:   •  metFORMIN (GLUCOPHAGE) 1000 MG tablet, Take 1,000 mg by mouth 2 (Two) Times a Day With Meals., Disp: , Rfl:   •  metoprolol tartrate (LOPRESSOR) 25 MG tablet, Take 0.5 tablets by mouth Every 12 (Twelve) Hours., Disp: 30  "tablet, Rfl: 5  •  ranitidine (ZANTAC) 150 MG capsule, Take 1 capsule by mouth 2 (Two) Times a Day., Disp: 60 capsule, Rfl: 4  •  nicotine (NICODERM CQ) 14 MG/24HR patch, Place 1 patch on the skin Daily., Disp: 30 patch, Rfl: 2      The following portions of the patient's history were reviewed and updated as appropriate: allergies, current medications, past family history, past medical history, past social history, past surgical history and problem list.    Social History   Substance Use Topics   • Smoking status: Current Every Day Smoker     Packs/day: 1.50     Years: 35.00     Types: Cigarettes   • Smokeless tobacco: None   • Alcohol use No       Review of Systems   Constitution: Negative for chills and fever.   HENT: Negative for nosebleeds and sore throat.    Cardiovascular: Positive for palpitations. Negative for chest pain, leg swelling and syncope.   Respiratory: Positive for shortness of breath. Negative for cough, hemoptysis and wheezing.    Gastrointestinal: Negative for abdominal pain, hematemesis, hematochezia, melena, nausea and vomiting.   Genitourinary: Negative for dysuria and hematuria.   Neurological: Positive for dizziness. Negative for headaches.       Objective   Vitals:    01/29/18 1018   BP: 107/69   BP Location: Left arm   Patient Position: Sitting   Cuff Size: Adult   Pulse: 57   SpO2: 96%   Weight: 96.7 kg (213 lb 3.2 oz)   Height: 177.8 cm (70\")     Body mass index is 30.59 kg/(m^2).        Physical Exam   Constitutional: He is oriented to person, place, and time. He appears well-developed and well-nourished.   HENT:   Head: Normocephalic.   Eyes: Conjunctivae and EOM are normal.   Neck: Normal range of motion. Neck supple. No JVD present. No tracheal deviation present. No thyromegaly present.   Cardiovascular: Normal rate, regular rhythm, S1 normal and S2 normal.  Exam reveals no gallop, no S3, no S4 and no friction rub.    No murmur heard.  Pulmonary/Chest: Breath sounds normal. No " respiratory distress. He has no wheezes. He has no rales.   Abdominal: Soft. Bowel sounds are normal. He exhibits no mass. There is no tenderness.   Musculoskeletal: He exhibits no edema.   Neurological: He is alert and oriented to person, place, and time. No cranial nerve deficit.   Skin: Skin is warm and dry.   Psychiatric: He has a normal mood and affect.       Lab Results   Component Value Date     (L) 11/01/2017    K 3.9 11/01/2017     11/01/2017    CO2 23.4 (L) 11/01/2017    BUN 14 11/01/2017    CREATININE 0.81 11/01/2017    GLUCOSE 195 (H) 11/01/2017    CALCIUM 9.7 11/01/2017    AST 22 11/01/2017    ALT 36 11/01/2017    ALKPHOS 133 (H) 11/01/2017    LABIL2 1.3 (L) 11/01/2017     Lab Results   Component Value Date    CKTOTAL 101 07/25/2016     Lab Results   Component Value Date    WBC 18.80 (H) 11/01/2017    HGB 14.7 11/01/2017    HCT 42.8 11/01/2017     11/01/2017     No results found for: INR  No results found for: MG  Lab Results   Component Value Date    TRIG 162 (H) 06/12/2017    HDL 33 (L) 06/12/2017      Lab Results   Component Value Date    BNP 18.0 07/25/2016     Echo   No results found for: ECHOEFEST    ECG 12 Lead  Date/Time: 1/29/2018 10:05 AM  Performed by: CHRISTINE BUTTS  Authorized by: CHRISTINE BUTTS   Rhythm: sinus bradycardia  BPM: 55  Conduction: conduction normal  ST Segments: ST segments normal  T Waves: T waves normal              Assessment/Plan    Diagnosis Plan   1. ASCVD (arteriosclerotic cardiovascular disease), status post stenting of the left circumflex with 2.5×8 mm Alpine stent on 7/27/2016.     2. Precordial pain     3. Dyslipidemia     4. Renal carcinoma, right, status post right partial nephrectomy in November 2017.            Recommendations:    1. Since his blood pressure and heart rate running a bit on the low side, we'll decrease the dose of metoprolol to 12.5 mg by mouth twice a day.  2.  Continue with aspirin and Plavix and lisinopril.  3. Have  reemphasized for him to quit smoking.    4. I will prescribe NicoDerm patches to help quit smoking.  5. If he has recurrent and increasing chest pains, we will evaluate further with a stress sestamibi study.    Return in about 3 months (around 4/29/2018) for or sooner if needed.    As always, I appreciate very much the opportunity to participate in the cardiovascular care of your patients.      With Best Regards,    Chin Kathleen MD, FACC    Dragon disclaimer:  Much of this encounter note is an electronic transcription/translation of spoken language to printed text. The electronic translation of spoken language may permit erroneous, or at times, nonsensical words or phrases to be inadvertently transcribed; Although I have reviewed the note for such errors, some may still exist.

## 2018-02-02 ENCOUNTER — TELEPHONE (OUTPATIENT)
Dept: CARDIOLOGY | Facility: CLINIC | Age: 58
End: 2018-02-02

## 2018-02-02 NOTE — TELEPHONE ENCOUNTER
Please cancel other prescription and send in....    21 mg patch once daily ×6 weeks    14 mg patch once daily ×2 weeks    7 mg patch once daily for 2 weeks

## 2018-02-02 NOTE — TELEPHONE ENCOUNTER
Pt's wife called and stated that the Nicotine patch  sent in needed to be the step one because Yury smokes 30 cigs a day. She wanted to know if we could send in the step one for him.   She also stated that  had sent her in some as well but I didn't see anything in her chart regarding it.

## 2018-04-18 RX ORDER — RANITIDINE 150 MG/1
150 CAPSULE ORAL 2 TIMES DAILY
Qty: 60 CAPSULE | Refills: 0 | Status: SHIPPED | OUTPATIENT
Start: 2018-04-18 | End: 2018-07-26 | Stop reason: SDUPTHER

## 2018-04-30 ENCOUNTER — OFFICE VISIT (OUTPATIENT)
Dept: CARDIOLOGY | Facility: CLINIC | Age: 58
End: 2018-04-30

## 2018-04-30 VITALS
RESPIRATION RATE: 16 BRPM | BODY MASS INDEX: 31.64 KG/M2 | DIASTOLIC BLOOD PRESSURE: 66 MMHG | HEART RATE: 68 BPM | SYSTOLIC BLOOD PRESSURE: 112 MMHG | HEIGHT: 70 IN | WEIGHT: 221 LBS

## 2018-04-30 DIAGNOSIS — C64.1 RENAL CARCINOMA, RIGHT (HCC): ICD-10-CM

## 2018-04-30 DIAGNOSIS — I25.10 ASCVD (ARTERIOSCLEROTIC CARDIOVASCULAR DISEASE): Primary | ICD-10-CM

## 2018-04-30 DIAGNOSIS — E78.5 DYSLIPIDEMIA: ICD-10-CM

## 2018-04-30 PROCEDURE — 99213 OFFICE O/P EST LOW 20 MIN: CPT | Performed by: INTERNAL MEDICINE

## 2018-04-30 NOTE — PROGRESS NOTES
Garrison Hayes MD  Yury Thibodeaux  1960 04/30/2018    Patient Active Problem List   Diagnosis   • Precordial pain   • Dyslipidemia   • Palpitations   • ASCVD (arteriosclerotic cardiovascular disease), status post stenting of the left circumflex with 2.5×8 mm Alpine stent on 7/27/2016.   • Renal carcinoma, right, status post right partial nephrectomy in November 2017.       Dear Garrison Hayes MD:    Subjective     Yury Thibodeaux is a 57 y.o. male with the problems as listed above, presents    Chief complaint: Follow-up of ASCVD, status post stenting.    History of Present Illness:Mr. Thibodeaux is a pleasant 57-year-old  male with history of ASCVD, status post stenting of the mid and distal left circumflex coronary artery in July 2016, is here for a cardiology follow up.  He denies any further episodes of chest pains or shortness of breath.  Overall has been doing well.    No Known Allergies:      Current Outpatient Prescriptions:   •  aspirin  MG tablet, Take 1 tablet by mouth Daily., Disp: 30 tablet, Rfl: 5  •  atorvastatin (LIPITOR) 80 MG tablet, Take 1 tablet by mouth Daily., Disp: 30 tablet, Rfl: 5  •  clopidogrel (PLAVIX) 75 MG tablet, Take 1 tablet by mouth Daily., Disp: 30 tablet, Rfl: 5  •  glipiZIDE (GLUCOTROL) 5 MG tablet, Take 5 mg by mouth 2 (Two) Times a Day Before Meals., Disp: , Rfl:   •  lisinopril (PRINIVIL,ZESTRIL) 5 MG tablet, Take 1 tablet by mouth Daily., Disp: 30 tablet, Rfl: 5  •  loperamide (IMODIUM) 2 MG capsule, Take 2 mg by mouth 4 (Four) Times a Day As Needed for Diarrhea., Disp: , Rfl:   •  metFORMIN (GLUCOPHAGE) 1000 MG tablet, Take 1,000 mg by mouth 2 (Two) Times a Day With Meals., Disp: , Rfl:   •  metoprolol tartrate (LOPRESSOR) 25 MG tablet, Take 0.5 tablets by mouth Every 12 (Twelve) Hours., Disp: 30 tablet, Rfl: 5  •  nicotine (NICODERM CQ) 14 MG/24HR patch, Place 1 patch on the skin Daily., Disp: 30 patch, Rfl: 2  •  ranitidine (ZANTAC) 150 MG  "capsule, Take 1 capsule by mouth 2 (Two) Times a Day., Disp: 60 capsule, Rfl: 0      The following portions of the patient's history were reviewed and updated as appropriate: allergies, current medications, past family history, past medical history, past social history, past surgical history and problem list.    Social History   Substance Use Topics   • Smoking status: Former Smoker     Packs/day: 1.50     Years: 35.00     Types: Cigarettes     Quit date: 2/28/2018   • Smokeless tobacco: Never Used   • Alcohol use No       Review of Systems   Constitution: Negative for chills and fever.   HENT: Negative for nosebleeds and sore throat.    Cardiovascular: Negative for chest pain, leg swelling and palpitations.   Respiratory: Negative for cough, hemoptysis, shortness of breath and wheezing.    Gastrointestinal: Negative for abdominal pain, hematemesis, hematochezia, melena, nausea and vomiting.   Genitourinary: Negative for dysuria and hematuria.   Neurological: Negative for headaches.       Objective   Vitals:    04/30/18 1546   BP: 112/66   Pulse: 68   Resp: 16   Weight: 100 kg (221 lb)   Height: 177.8 cm (70\")     Body mass index is 31.71 kg/m².        Physical Exam   Constitutional: He is oriented to person, place, and time. He appears well-developed and well-nourished.   HENT:   Mouth/Throat: Oropharynx is clear and moist.   Eyes: EOM are normal. Pupils are equal, round, and reactive to light.   Neck: Neck supple. No JVD present. No tracheal deviation present. No thyromegaly present.   Cardiovascular: Normal rate, regular rhythm, S1 normal and S2 normal.  Exam reveals no gallop, no S3, no S4 and no friction rub.    No murmur heard.  Pulmonary/Chest: Effort normal and breath sounds normal.   Abdominal: Soft. Bowel sounds are normal. He exhibits no mass. There is no tenderness.   Musculoskeletal: Normal range of motion. He exhibits no edema.   Lymphadenopathy:     He has no cervical adenopathy.   Neurological: He is " alert and oriented to person, place, and time.   Skin: Skin is warm and dry. No rash noted.   Psychiatric: He has a normal mood and affect.       Lab Results   Component Value Date     (L) 11/01/2017    K 3.9 11/01/2017     11/01/2017    CO2 23.4 (L) 11/01/2017    BUN 14 11/01/2017    CREATININE 0.81 11/01/2017    GLUCOSE 195 (H) 11/01/2017    CALCIUM 9.7 11/01/2017    AST 22 11/01/2017    ALT 36 11/01/2017    ALKPHOS 133 (H) 11/01/2017    LABIL2 1.3 (L) 11/01/2017     Lab Results   Component Value Date    CKTOTAL 101 07/25/2016     Lab Results   Component Value Date    WBC 18.80 (H) 11/01/2017    HGB 14.7 11/01/2017    HCT 42.8 11/01/2017     11/01/2017     No results found for: INR  No results found for: MG  Lab Results   Component Value Date    TRIG 162 (H) 06/12/2017    HDL 33 (L) 06/12/2017    LDL 39 06/12/2017      Lab Results   Component Value Date    BNP 18.0 07/25/2016     Echo   No results found for: ECHOEFEST  Procedures    Assessment/Plan    Diagnosis Plan   1. ASCVD (arteriosclerotic cardiovascular disease), status post stenting of the left circumflex with 2.5×8 mm Alpine stent on 7/27/2016.     2. Renal carcinoma, right, status post right partial nephrectomy in November 2017.     3. Dyslipidemia          Recommendations:  Continue With aspirin, atorvastatin, Plavix, lisinopril and metoprolol at current doses.    Follow-up in 6 months.    Return in about 6 months (around 10/30/2018).    As always, I appreciate very much the opportunity to participate in the cardiovascular care of your patients.      With Best Regards,    Chin Kathleen MD, [facility]    Dragon disclaimer:  Much of this encounter note is an electronic transcription/translation of spoken language to printed text. The electronic translation of spoken language may permit erroneous, or at times, nonsensical words or phrases to be inadvertently transcribed; Although I have reviewed the note for such errors, some may still  exist.

## 2018-07-09 RX ORDER — ATORVASTATIN CALCIUM 80 MG/1
TABLET, FILM COATED ORAL
Qty: 30 TABLET | Refills: 5 | Status: SHIPPED | OUTPATIENT
Start: 2018-07-09 | End: 2019-02-26 | Stop reason: SDUPTHER

## 2018-07-25 ENCOUNTER — TELEPHONE (OUTPATIENT)
Dept: CARDIOLOGY | Facility: CLINIC | Age: 58
End: 2018-07-25

## 2018-07-25 NOTE — TELEPHONE ENCOUNTER
Called patient to speak with him in regards to his Zantac and why he has not took any in the last 4 months. Left a voice mail to call me back.

## 2018-07-26 RX ORDER — RANITIDINE 150 MG/1
150 CAPSULE ORAL DAILY
Qty: 30 CAPSULE | Refills: 5 | Status: SHIPPED | OUTPATIENT
Start: 2018-07-26 | End: 2019-02-26 | Stop reason: SDUPTHER

## 2018-07-26 NOTE — TELEPHONE ENCOUNTER
Pt's wife called and asked about pt's Zantac. She states pt has not had to get refills for awhile because he only takes it one time daily and we have been sending it in for twice daily.  Pt has a follow up in 11/2018.  I will send in refills to last until then.

## 2018-11-14 ENCOUNTER — OFFICE VISIT (OUTPATIENT)
Dept: CARDIOLOGY | Facility: CLINIC | Age: 58
End: 2018-11-14

## 2018-11-14 VITALS
RESPIRATION RATE: 16 BRPM | SYSTOLIC BLOOD PRESSURE: 117 MMHG | BODY MASS INDEX: 31.35 KG/M2 | HEIGHT: 70 IN | WEIGHT: 219 LBS | HEART RATE: 62 BPM | DIASTOLIC BLOOD PRESSURE: 75 MMHG

## 2018-11-14 DIAGNOSIS — R07.2 PRECORDIAL PAIN: ICD-10-CM

## 2018-11-14 DIAGNOSIS — I25.10 ASCVD (ARTERIOSCLEROTIC CARDIOVASCULAR DISEASE): Primary | ICD-10-CM

## 2018-11-14 PROCEDURE — 93000 ELECTROCARDIOGRAM COMPLETE: CPT | Performed by: PHYSICIAN ASSISTANT

## 2018-11-14 PROCEDURE — 99213 OFFICE O/P EST LOW 20 MIN: CPT | Performed by: PHYSICIAN ASSISTANT

## 2018-11-14 RX ORDER — HYDROCODONE BITARTRATE AND ACETAMINOPHEN 10; 325 MG/1; MG/1
1 TABLET ORAL EVERY 6 HOURS PRN
COMMUNITY

## 2018-11-14 RX ORDER — PRIMIDONE 50 MG/1
50 TABLET ORAL TAKE AS DIRECTED
COMMUNITY

## 2018-11-14 NOTE — PROGRESS NOTES
Garrison Hayes MD  Yury Thibodeaux  1960 11/14/2018    Patient Active Problem List   Diagnosis   • Precordial pain   • Dyslipidemia   • Palpitations   • ASCVD (arteriosclerotic cardiovascular disease), status post stenting of the left circumflex with 2.5×8 mm Alpine stent on 7/27/2016.   • Renal carcinoma, right, status post right partial nephrectomy in November 2017.       Dear Garrison Hayes MD:    Subjective       History of Present Illness:    Chief Complaint   Patient presents with   • Follow-up     6 mos   • Palpitations     flutters   • Shortness of Breath     congestion related   • Med Management     call pharmacy       Yury Thibodeaux is a pleasant 58 y.o. male with a past medical history significant for ASCVD, status post stenting of the mid and distal left circumflex coronary artery in July 2016, is here for a cardiology follow up.     Patient states that he's been doing very well.  He denies any chest pain, shortness of breath, palpitations, weakness, fatigue, dizziness, or syncope.      No Known Allergies:      Current Outpatient Medications:   •  atorvastatin (LIPITOR) 80 MG tablet, TAKE ONE TABLET BY MOUTH EVERY DAY, Disp: 30 tablet, Rfl: 5  •  clopidogrel (PLAVIX) 75 MG tablet, Take 1 tablet by mouth Daily., Disp: 30 tablet, Rfl: 5  •  glipiZIDE (GLUCOTROL) 5 MG tablet, Take 5 mg by mouth 2 (Two) Times a Day Before Meals., Disp: , Rfl:   •  HYDROcodone-acetaminophen (NORCO)  MG per tablet, Take 1 tablet by mouth Every 6 (Six) Hours As Needed for Moderate Pain ., Disp: , Rfl:   •  metFORMIN (GLUCOPHAGE) 1000 MG tablet, Take 500 mg by mouth 2 (Two) Times a Day With Meals., Disp: , Rfl:   •  primidone (MYSOLINE) 50 MG tablet, Take 50 mg by mouth Take As Directed., Disp: , Rfl:   •  ranitidine (ZANTAC) 150 MG capsule, Take 1 capsule by mouth Daily., Disp: 30 capsule, Rfl: 5  •  aspirin 81 MG tablet, Take 1 tablet by mouth Daily., Disp: 30 tablet, Rfl: 11  •  lisinopril  "(PRINIVIL,ZESTRIL) 5 MG tablet, Take 1 tablet by mouth Daily., Disp: 30 tablet, Rfl: 5  •  loperamide (IMODIUM) 2 MG capsule, Take 2 mg by mouth 4 (Four) Times a Day As Needed for Diarrhea., Disp: , Rfl:   •  metoprolol tartrate (LOPRESSOR) 25 MG tablet, Take 0.5 tablets by mouth Every 12 (Twelve) Hours., Disp: 30 tablet, Rfl: 5  •  nicotine (NICODERM CQ) 14 MG/24HR patch, Place 1 patch on the skin Daily., Disp: 30 patch, Rfl: 2      The following portions of the patient's history were reviewed and updated as appropriate: allergies, current medications, past family history, past medical history, past social history, past surgical history and problem list.    Social History     Tobacco Use   • Smoking status: Current Every Day Smoker     Packs/day: 1.00     Years: 35.00     Pack years: 35.00     Types: Cigarettes     Last attempt to quit: 2018     Years since quittin.7   • Smokeless tobacco: Never Used   Substance Use Topics   • Alcohol use: No   • Drug use: No       Review of Systems   Constitution: Negative for weakness and malaise/fatigue.   HENT: Positive for congestion.    Cardiovascular: Positive for palpitations. Negative for chest pain, dyspnea on exertion, irregular heartbeat and leg swelling.   Respiratory: Negative for cough and shortness of breath.    Hematologic/Lymphatic: Negative for bleeding problem. Does not bruise/bleed easily.   Gastrointestinal: Negative for nausea and vomiting.       Objective   Vitals:    18 1308   BP: 117/75   Pulse: 62   Resp: 16   Weight: 99.3 kg (219 lb)   Height: 177.8 cm (70\")     Body mass index is 31.42 kg/m².        Physical Exam   Constitutional: He is oriented to person, place, and time. He appears well-developed and well-nourished. No distress.   HENT:   Head: Normocephalic and atraumatic.   Cardiovascular: Normal rate, regular rhythm, normal heart sounds and intact distal pulses.   Pulmonary/Chest: Effort normal and breath sounds normal. No " respiratory distress.   Musculoskeletal: He exhibits no edema.   Neurological: He is alert and oriented to person, place, and time.   Skin: He is not diaphoretic.       Lab Results   Component Value Date     (L) 11/01/2017    K 3.9 11/01/2017     11/01/2017    CO2 23.4 (L) 11/01/2017    BUN 14 11/01/2017    CREATININE 0.81 11/01/2017    GLUCOSE 195 (H) 11/01/2017    CALCIUM 9.7 11/01/2017    AST 22 11/01/2017    ALT 36 11/01/2017    ALKPHOS 133 (H) 11/01/2017     Lab Results   Component Value Date    CKTOTAL 101 07/25/2016     Lab Results   Component Value Date    WBC 18.80 (H) 11/01/2017    HGB 14.7 11/01/2017    HCT 42.8 11/01/2017     11/01/2017     No results found for: INR  No results found for: MG  Lab Results   Component Value Date    TRIG 162 (H) 06/12/2017    HDL 33 (L) 06/12/2017    LDL 39 06/12/2017      Lab Results   Component Value Date    BNP 18.0 07/25/2016       During this visit the following were done:  Labs Reviewed [x]    Labs Ordered []    Radiology Reports Reviewed [x]    Radiology Ordered []    PCP Records Reviewed []    Referring Provider Records Reviewed []    ER Records Reviewed []    Hospital Records Reviewed []    History Obtained From Family []    Radiology Images Reviewed []    Other Reviewed []    Records Requested []         ECG 12 Lead  Date/Time: 11/14/2018 1:10 PM  Performed by: Ivan Jaimes PA-C  Authorized by: Ivan Jaimes PA-C   Rhythm: sinus rhythm  Conduction: conduction normal  ST Segments: ST segments normal  T Waves: T waves normal  Clinical impression: normal ECG  Comments:                 Assessment/Plan    Diagnosis Plan   1. ASCVD (arteriosclerotic cardiovascular disease), status post stenting of the left circumflex with 2.5×8 mm Alpine stent on 7/27/2016.  Lipid Panel    Basic Metabolic Panel   2. Precordial pain                  Recommendations:  1. Since patient does state that it's been many months since he had a cholesterol or  BMP checked her go ahead and order this.  2. Continue aspirin, Lipitor, Plavix, lisinopril, metoprolol.  3. Follow-up in 6 months.        Return in about 6 months (around 5/14/2019).    As always, I appreciate very much the opportunity to participate in the cardiovascular care of your patients.      With Best Regards,    SHEY Ponce disclaimer:  Much of this encounter note is an electronic transcription/translation of spoken language to printed text. The electronic translation of spoken language may permit erroneous, or at times, nonsensical words or phrases to be inadvertently transcribed; Although I have reviewed the note for such errors, some may still exist.

## 2018-11-29 RX ORDER — CLOPIDOGREL BISULFATE 75 MG/1
TABLET ORAL
Qty: 30 TABLET | Refills: 5 | Status: SHIPPED | OUTPATIENT
Start: 2018-11-29 | End: 2019-06-10 | Stop reason: SDUPTHER

## 2019-02-27 RX ORDER — RANITIDINE 150 MG/1
TABLET ORAL
Qty: 30 TABLET | Refills: 5 | Status: SHIPPED | OUTPATIENT
Start: 2019-02-27 | End: 2019-09-14 | Stop reason: SDUPTHER

## 2019-02-27 RX ORDER — ATORVASTATIN CALCIUM 80 MG/1
TABLET, FILM COATED ORAL
Qty: 30 TABLET | Refills: 5 | Status: SHIPPED | OUTPATIENT
Start: 2019-02-27 | End: 2019-10-17 | Stop reason: SDUPTHER

## 2019-05-14 ENCOUNTER — OFFICE VISIT (OUTPATIENT)
Dept: CARDIOLOGY | Facility: CLINIC | Age: 59
End: 2019-05-14

## 2019-05-14 VITALS
SYSTOLIC BLOOD PRESSURE: 117 MMHG | DIASTOLIC BLOOD PRESSURE: 65 MMHG | BODY MASS INDEX: 31.64 KG/M2 | HEART RATE: 73 BPM | RESPIRATION RATE: 16 BRPM | HEIGHT: 70 IN | WEIGHT: 221 LBS

## 2019-05-14 DIAGNOSIS — I25.10 ASCVD (ARTERIOSCLEROTIC CARDIOVASCULAR DISEASE): Primary | ICD-10-CM

## 2019-05-14 DIAGNOSIS — C64.1 RENAL CARCINOMA, RIGHT (HCC): ICD-10-CM

## 2019-05-14 DIAGNOSIS — E78.5 DYSLIPIDEMIA: ICD-10-CM

## 2019-05-14 PROCEDURE — 93000 ELECTROCARDIOGRAM COMPLETE: CPT | Performed by: PHYSICIAN ASSISTANT

## 2019-05-14 PROCEDURE — 99213 OFFICE O/P EST LOW 20 MIN: CPT | Performed by: PHYSICIAN ASSISTANT

## 2019-05-14 NOTE — PROGRESS NOTES
Garrison Hayes MD  Yury Thibodeaux  1960 05/14/2019    Patient Active Problem List   Diagnosis   • Precordial pain   • Dyslipidemia   • Palpitations   • ASCVD (arteriosclerotic cardiovascular disease), status post stenting of the left circumflex with 2.5×8 mm Alpine stent on 7/27/2016.   • Renal carcinoma, right, status post right partial nephrectomy in November 2017.       Dear Garrison Hayes MD:    Subjective     History of Present Illness:    Chief Complaint   Patient presents with   • Follow-up     6 mos   • Med Management     verbal       Yury Thibodeaux is a pleasant 58 y.o. male with a past medical history significant for ASCVD, status post stenting of the mid and distal left circumflex coronary artery in July 2016, is here for a cardiology follow up.     Patient reports that he has been doing well.  He denies any chest pain, shortness of breath, dizziness, or syncope.  He does admit to some mild palpitations that occur infrequently that last less than a second and do not affect his activities of daily living.  He admits that he does not check his blood pressure at home but states when at other doctor's offices his blood pressures is also been controlled.      No Known Allergies:      Current Outpatient Medications:   •  atorvastatin (LIPITOR) 80 MG tablet, TAKE ONE TABLET BY MOUTH EVERY DAY, Disp: 30 tablet, Rfl: 5  •  clopidogrel (PLAVIX) 75 MG tablet, TAKE ONE TABLET BY MOUTH EVERY DAY, Disp: 30 tablet, Rfl: 5  •  glipiZIDE (GLUCOTROL) 5 MG tablet, Take 5 mg by mouth 2 (Two) Times a Day Before Meals., Disp: , Rfl:   •  HYDROcodone-acetaminophen (NORCO)  MG per tablet, Take 1 tablet by mouth Every 6 (Six) Hours As Needed for Moderate Pain ., Disp: , Rfl:   •  lisinopril (PRINIVIL,ZESTRIL) 5 MG tablet, Take 1 tablet by mouth Daily., Disp: 30 tablet, Rfl: 5  •  loperamide (IMODIUM) 2 MG capsule, Take 2 mg by mouth 4 (Four) Times a Day As Needed for Diarrhea., Disp: , Rfl:   •   "metFORMIN (GLUCOPHAGE) 1000 MG tablet, Take 500 mg by mouth 2 (Two) Times a Day With Meals., Disp: , Rfl:   •  metoprolol tartrate (LOPRESSOR) 25 MG tablet, Take 0.5 tablets by mouth Every 12 (Twelve) Hours., Disp: 30 tablet, Rfl: 5  •  raNITIdine (ZANTAC) 150 MG tablet, TAKE 1 TABLET BY MOUTH DAILY., Disp: 30 tablet, Rfl: 5  •  aspirin 81 MG tablet, Take 1 tablet by mouth Daily., Disp: 30 tablet, Rfl: 11  •  nicotine (NICODERM CQ) 14 MG/24HR patch, Place 1 patch on the skin Daily., Disp: 30 patch, Rfl: 2  •  primidone (MYSOLINE) 50 MG tablet, Take 50 mg by mouth Take As Directed., Disp: , Rfl:     The following portions of the patient's history were reviewed and updated as appropriate: allergies, current medications, past family history, past medical history, past social history, past surgical history and problem list.    Social History     Tobacco Use   • Smoking status: Current Every Day Smoker     Packs/day: 1.00     Years: 35.00     Pack years: 35.00     Types: Cigarettes     Last attempt to quit: 2018     Years since quittin.2   • Smokeless tobacco: Never Used   Substance Use Topics   • Alcohol use: No   • Drug use: No       Review of Systems   Constitution: Negative for weakness and malaise/fatigue.   Cardiovascular: Negative for chest pain, dyspnea on exertion, irregular heartbeat, leg swelling and palpitations.   Respiratory: Negative for cough and shortness of breath.    Hematologic/Lymphatic: Negative for bleeding problem. Does not bruise/bleed easily.   Gastrointestinal: Negative for nausea and vomiting.       Objective   Vitals:    19 1539   BP: 117/65   Pulse: 73   Resp: 16   Weight: 100 kg (221 lb)   Height: 177.8 cm (70\")     Body mass index is 31.71 kg/m².    Physical Exam   Constitutional: He is oriented to person, place, and time. He appears well-developed and well-nourished. No distress.   HENT:   Head: Normocephalic and atraumatic.   Cardiovascular: Normal rate, regular rhythm, " normal heart sounds and intact distal pulses.   Pulmonary/Chest: Effort normal and breath sounds normal. No respiratory distress.   Musculoskeletal: He exhibits no edema.   Neurological: He is alert and oriented to person, place, and time.   Skin: He is not diaphoretic.     Lab Results   Component Value Date     (L) 11/01/2017    K 3.9 11/01/2017     11/01/2017    CO2 23.4 (L) 11/01/2017    BUN 14 11/01/2017    CREATININE 0.81 11/01/2017    GLUCOSE 195 (H) 11/01/2017    CALCIUM 9.7 11/01/2017    AST 22 11/01/2017    ALT 36 11/01/2017    ALKPHOS 133 (H) 11/01/2017     Lab Results   Component Value Date    CKTOTAL 101 07/25/2016     Lab Results   Component Value Date    WBC 18.80 (H) 11/01/2017    HGB 14.7 11/01/2017    HCT 42.8 11/01/2017     11/01/2017     No results found for: INR  No results found for: MG  Lab Results   Component Value Date    TRIG 162 (H) 06/12/2017    HDL 33 (L) 06/12/2017    LDL 39 06/12/2017      Lab Results   Component Value Date    BNP 18.0 07/25/2016       During this visit the following were done:  Labs Reviewed [x]    Labs Ordered []    Radiology Reports Reviewed [x]    Radiology Ordered []    PCP Records Reviewed []    Referring Provider Records Reviewed []    ER Records Reviewed []    Hospital Records Reviewed []    History Obtained From Family []    Radiology Images Reviewed []    Other Reviewed []    Records Requested []         ECG 12 Lead  Date/Time: 5/14/2019 3:41 PM  Performed by: Ivan Jaimes PA-C  Authorized by: Ivan Jaimes PA-C   Comparison: compared with previous ECG   Similar to previous ECG  Rhythm: sinus rhythm  Conduction: conduction normal    Clinical impression: normal ECG            Assessment/Plan    Diagnosis Plan   1. ASCVD (arteriosclerotic cardiovascular disease), status post stenting of the left circumflex with 2.5×8 mm Alpine stent on 7/27/2016.     2. Renal carcinoma, right, status post right partial nephrectomy in November 2017.      3. Dyslipidemia  Lipid Panel          Recommendations:  1. Patient is doing well from cardiac standpoint.  He is asymptomatic and denies any bleeding problems with aspirin and Plavix.  His blood pressure is also well controlled.  I will continue his metoprolol tartrate, lisinopril, Plavix, Lipitor, and aspirin.  2. I will also order a lipid panel since this is not been done in over 6 months.    Return in about 3 months (around 8/14/2019).    As always, I appreciate very much the opportunity to participate in the cardiovascular care of your patients.      With Best Regards,    Ivan Jaimes PA-C

## 2019-06-10 ENCOUNTER — TELEPHONE (OUTPATIENT)
Dept: CARDIOLOGY | Facility: CLINIC | Age: 59
End: 2019-06-10

## 2019-06-10 RX ORDER — CLOPIDOGREL BISULFATE 75 MG/1
TABLET ORAL
Qty: 30 TABLET | Refills: 4 | Status: SHIPPED | OUTPATIENT
Start: 2019-06-10 | End: 2019-11-14 | Stop reason: SDUPTHER

## 2019-09-16 RX ORDER — RANITIDINE 150 MG/1
TABLET ORAL
Qty: 30 TABLET | Refills: 2 | Status: SHIPPED | OUTPATIENT
Start: 2019-09-16

## 2019-09-16 NOTE — TELEPHONE ENCOUNTER
Dr. Kathleen prescribed this at 8/17/16 office visit and has been continued.  Next office visit 11/14/19.

## 2019-10-18 RX ORDER — ATORVASTATIN CALCIUM 80 MG/1
TABLET, FILM COATED ORAL
Qty: 30 TABLET | Refills: 0 | Status: SHIPPED | OUTPATIENT
Start: 2019-10-18 | End: 2019-11-14 | Stop reason: SDUPTHER

## 2019-11-14 ENCOUNTER — OFFICE VISIT (OUTPATIENT)
Dept: CARDIOLOGY | Facility: CLINIC | Age: 59
End: 2019-11-14

## 2019-11-14 VITALS
WEIGHT: 226.8 LBS | BODY MASS INDEX: 32.47 KG/M2 | HEART RATE: 63 BPM | HEIGHT: 70 IN | SYSTOLIC BLOOD PRESSURE: 113 MMHG | DIASTOLIC BLOOD PRESSURE: 74 MMHG | RESPIRATION RATE: 16 BRPM

## 2019-11-14 DIAGNOSIS — I25.10 ASCVD (ARTERIOSCLEROTIC CARDIOVASCULAR DISEASE): Primary | ICD-10-CM

## 2019-11-14 DIAGNOSIS — E78.5 DYSLIPIDEMIA: ICD-10-CM

## 2019-11-14 DIAGNOSIS — R00.2 PALPITATIONS: ICD-10-CM

## 2019-11-14 DIAGNOSIS — C64.1 RENAL CARCINOMA, RIGHT (HCC): ICD-10-CM

## 2019-11-14 PROCEDURE — 99213 OFFICE O/P EST LOW 20 MIN: CPT | Performed by: PHYSICIAN ASSISTANT

## 2019-11-14 PROCEDURE — 93000 ELECTROCARDIOGRAM COMPLETE: CPT | Performed by: PHYSICIAN ASSISTANT

## 2019-11-14 RX ORDER — ATORVASTATIN CALCIUM 80 MG/1
80 TABLET, FILM COATED ORAL DAILY
Qty: 30 TABLET | Refills: 6 | Status: SHIPPED | OUTPATIENT
Start: 2019-11-14 | End: 2020-05-27 | Stop reason: SDUPTHER

## 2019-11-14 RX ORDER — LANSOPRAZOLE 15 MG/1
30 CAPSULE, DELAYED RELEASE ORAL DAILY
COMMUNITY

## 2019-11-14 RX ORDER — CLOPIDOGREL BISULFATE 75 MG/1
75 TABLET ORAL DAILY
Qty: 30 TABLET | Refills: 4 | Status: SHIPPED | OUTPATIENT
Start: 2019-11-14 | End: 2020-05-04

## 2019-11-14 RX ORDER — LISINOPRIL 5 MG/1
5 TABLET ORAL DAILY
Qty: 30 TABLET | Refills: 5 | Status: SHIPPED | OUTPATIENT
Start: 2019-11-14 | End: 2020-05-27 | Stop reason: SDUPTHER

## 2020-05-04 RX ORDER — CLOPIDOGREL BISULFATE 75 MG/1
75 TABLET ORAL DAILY
Qty: 30 TABLET | Refills: 4 | Status: SHIPPED | OUTPATIENT
Start: 2020-05-04 | End: 2020-05-27 | Stop reason: SDUPTHER

## 2020-05-27 ENCOUNTER — OFFICE VISIT (OUTPATIENT)
Dept: CARDIOLOGY | Facility: CLINIC | Age: 60
End: 2020-05-27

## 2020-05-27 VITALS
BODY MASS INDEX: 32.21 KG/M2 | DIASTOLIC BLOOD PRESSURE: 73 MMHG | RESPIRATION RATE: 16 BRPM | TEMPERATURE: 98.9 F | SYSTOLIC BLOOD PRESSURE: 120 MMHG | HEART RATE: 56 BPM | HEIGHT: 70 IN | OXYGEN SATURATION: 97 % | WEIGHT: 225 LBS

## 2020-05-27 DIAGNOSIS — I25.10 ASCVD (ARTERIOSCLEROTIC CARDIOVASCULAR DISEASE): ICD-10-CM

## 2020-05-27 DIAGNOSIS — R00.2 PALPITATIONS: Primary | ICD-10-CM

## 2020-05-27 PROCEDURE — 93000 ELECTROCARDIOGRAM COMPLETE: CPT | Performed by: PHYSICIAN ASSISTANT

## 2020-05-27 PROCEDURE — 99213 OFFICE O/P EST LOW 20 MIN: CPT | Performed by: PHYSICIAN ASSISTANT

## 2020-05-27 RX ORDER — ATORVASTATIN CALCIUM 80 MG/1
80 TABLET, FILM COATED ORAL DAILY
Qty: 90 TABLET | Refills: 2 | Status: SHIPPED | OUTPATIENT
Start: 2020-05-27 | End: 2021-03-24

## 2020-05-27 RX ORDER — LISINOPRIL 5 MG/1
5 TABLET ORAL DAILY
Qty: 90 TABLET | Refills: 2 | Status: SHIPPED | OUTPATIENT
Start: 2020-05-27 | End: 2021-05-20 | Stop reason: SDUPTHER

## 2020-05-27 RX ORDER — CLOPIDOGREL BISULFATE 75 MG/1
75 TABLET ORAL DAILY
Qty: 90 TABLET | Refills: 2 | Status: SHIPPED | OUTPATIENT
Start: 2020-05-27 | End: 2021-03-24

## 2020-05-27 NOTE — PROGRESS NOTES
Garrison Hayes MD  Yury Thibodeaux  1960 05/27/2020    Patient Active Problem List   Diagnosis   • Precordial pain   • Dyslipidemia   • Palpitations   • ASCVD (arteriosclerotic cardiovascular disease), status post stenting of the left circumflex with 2.5×8 mm Alpine stent on 7/27/2016.   • Renal carcinoma, right, status post right partial nephrectomy in November 2017.       Dear Garrison Hayes MD:    Subjective       History of Present Illness:    Chief Complaint   Patient presents with   • ASCVD     6 MONTH FOLLOW UP   • Diabetes   • Hyperlipidemia       Yury Thibodeaux is a pleasant 59 y.o. male with a past medical history significant for ASCVD, status post stenting of the mid and distal left circumflex coronary artery in July 2016, is here for a cardiology follow up.    Mr. Thibodeaux reports that he has been doing well since he was last seen.  He does deny any chest pains, worsening shortness of breath from his baseline, dizziness, or syncope.  His blood pressure is well controlled today in the office and he denies any issues with his medications.  He also denies any bleeding issues with his aspirin and Plavix.  He does report some palpitations that occur occasionally but do not affect his activity daily living and only last for a few seconds at a time.      No Known Allergies:      Current Outpatient Medications:   •  aspirin 81 MG tablet, Take 1 tablet by mouth Daily., Disp: 30 tablet, Rfl: 11  •  atorvastatin (LIPITOR) 80 MG tablet, Take 1 tablet by mouth Daily., Disp: 90 tablet, Rfl: 2  •  clopidogrel (PLAVIX) 75 MG tablet, Take 1 tablet by mouth Daily., Disp: 90 tablet, Rfl: 2  •  glipiZIDE (GLUCOTROL) 5 MG tablet, Take 5 mg by mouth 2 (Two) Times a Day Before Meals., Disp: , Rfl:   •  HYDROcodone-acetaminophen (NORCO)  MG per tablet, Take 1 tablet by mouth Every 6 (Six) Hours As Needed for Moderate Pain ., Disp: , Rfl:   •  lansoprazole (PREVACID) 15 MG capsule, Take 30 mg by mouth  Daily., Disp: , Rfl:   •  loperamide (IMODIUM) 2 MG capsule, Take 2 mg by mouth 4 (Four) Times a Day As Needed for Diarrhea., Disp: , Rfl:   •  metFORMIN (GLUCOPHAGE) 1000 MG tablet, Take 500 mg by mouth 2 (Two) Times a Day With Meals., Disp: , Rfl:   •  metoprolol tartrate (LOPRESSOR) 25 MG tablet, Take 0.5 tablets by mouth Every 12 (Twelve) Hours., Disp: 90 tablet, Rfl: 2  •  lisinopril (PRINIVIL,ZESTRIL) 5 MG tablet, Take 1 tablet by mouth Daily., Disp: 90 tablet, Rfl: 2  •  nicotine (NICODERM CQ) 14 MG/24HR patch, Place 1 patch on the skin Daily., Disp: 30 patch, Rfl: 2  •  primidone (MYSOLINE) 50 MG tablet, Take 50 mg by mouth Take As Directed., Disp: , Rfl:   •  raNITIdine (ZANTAC) 150 MG tablet, TAKE 1 TABLET BY MOUTH DAILY, Disp: 30 tablet, Rfl: 2      The following portions of the patient's history were reviewed and updated as appropriate: allergies, current medications, past family history, past medical history, past social history, past surgical history and problem list.    Social History     Tobacco Use   • Smoking status: Current Every Day Smoker     Packs/day: 1.00     Years: 35.00     Pack years: 35.00     Types: Cigarettes     Last attempt to quit: 2018     Years since quittin.2   • Smokeless tobacco: Never Used   Substance Use Topics   • Alcohol use: No   • Drug use: No       Review of Systems   Constitution: Negative for malaise/fatigue.   Cardiovascular: Negative for chest pain, dyspnea on exertion and irregular heartbeat.   Respiratory: Negative for cough and shortness of breath.    Hematologic/Lymphatic: Negative for bleeding problem. Does not bruise/bleed easily.   Musculoskeletal: Positive for arthritis and back pain.   Gastrointestinal: Negative for nausea and vomiting.   Neurological: Negative for weakness.       Objective   Vitals:    20 1152   BP: 120/73   BP Location: Left arm   Patient Position: Sitting   Pulse: 56   Resp: 16   Temp: 98.9 °F (37.2 °C)   TempSrc: Temporal  "  SpO2: 97%   Weight: 102 kg (225 lb)   Height: 177.8 cm (70\")     Body mass index is 32.28 kg/m².        Physical Exam   Constitutional: He is oriented to person, place, and time. He appears well-developed and well-nourished. No distress.   HENT:   Head: Normocephalic and atraumatic.   Cardiovascular: Normal rate, regular rhythm and normal heart sounds.   Pulmonary/Chest: Effort normal. No respiratory distress. He has wheezes.   Musculoskeletal: He exhibits no edema.   Neurological: He is alert and oriented to person, place, and time.   Skin: He is not diaphoretic.       Lab Results   Component Value Date     (L) 11/01/2017    K 3.9 11/01/2017     11/01/2017    CO2 23.4 (L) 11/01/2017    BUN 14 11/01/2017    CREATININE 0.81 11/01/2017    GLUCOSE 195 (H) 11/01/2017    CALCIUM 9.7 11/01/2017    AST 22 11/01/2017    ALT 36 11/01/2017    ALKPHOS 133 (H) 11/01/2017     Lab Results   Component Value Date    CKTOTAL 101 07/25/2016     Lab Results   Component Value Date    WBC 18.80 (H) 11/01/2017    HGB 14.7 11/01/2017    HCT 42.8 11/01/2017     11/01/2017     No results found for: INR  No results found for: MG  Lab Results   Component Value Date    TRIG 162 (H) 06/12/2017    HDL 33 (L) 06/12/2017    LDL 39 06/12/2017      Lab Results   Component Value Date    BNP 18.0 07/25/2016       During this visit the following were done:  Labs Reviewed [x]    Labs Ordered []    Radiology Reports Reviewed [x]    Radiology Ordered []    PCP Records Reviewed []    Referring Provider Records Reviewed []    ER Records Reviewed []    Hospital Records Reviewed []    History Obtained From Family []    Radiology Images Reviewed []    Other Reviewed []    Records Requested []       ECG 12 Lead  Date/Time: 5/27/2020 12:02 PM  Performed by: Ivan Jaimes PA-C  Authorized by: Ivan Jaimes PA-C   Comparison: compared with previous ECG from 11/14/2019  Similar to previous ECG  Rhythm: sinus rhythm  Conduction: " conduction normal    Clinical impression: normal ECG          Assessment/Plan    Diagnosis Plan   1. Palpitations  ECG 12 Lead   2. ASCVD (arteriosclerotic cardiovascular disease), status post stenting of the left circumflex with 2.5×8 mm Alpine stent on 7/27/2016.  Comprehensive Metabolic Panel    Lipid Panel          Recommendations:  1. Overall Mr. Thibodeaux appears stable from cardiac standpoint his only symptom being palpitations that are mild and infrequent and do not affect his activities of daily living because of this I will continue current regimen which includes metoprolol tartrate, lisinopril, Plavix, Lipitor, aspirin.       Return in about 9 months (around 2/27/2021).    As always, I appreciate very much the opportunity to participate in the cardiovascular care of your patients.      With Best Regards,    SHEY Ponce disclaimer:  Much of this encounter note is an electronic transcription/translation of spoken language to printed text. The electronic translation of spoken language may permit erroneous, or at times, nonsensical words or phrases to be inadvertently transcribed; Although I have reviewed the note for such errors, some may still exist.

## 2021-01-25 ENCOUNTER — TELEPHONE (OUTPATIENT)
Dept: CARDIOLOGY | Facility: CLINIC | Age: 61
End: 2021-01-25

## 2021-03-24 RX ORDER — CLOPIDOGREL BISULFATE 75 MG/1
75 TABLET ORAL DAILY
Qty: 30 TABLET | Refills: 2 | Status: SHIPPED | OUTPATIENT
Start: 2021-03-24 | End: 2021-05-20 | Stop reason: SDUPTHER

## 2021-03-24 RX ORDER — ATORVASTATIN CALCIUM 80 MG/1
80 TABLET, FILM COATED ORAL DAILY
Qty: 30 TABLET | Refills: 2 | Status: SHIPPED | OUTPATIENT
Start: 2021-03-24 | End: 2021-05-20 | Stop reason: SDUPTHER

## 2021-03-25 ENCOUNTER — TRANSCRIBE ORDERS (OUTPATIENT)
Dept: ADMINISTRATIVE | Facility: HOSPITAL | Age: 61
End: 2021-03-25

## 2021-03-29 ENCOUNTER — TRANSCRIBE ORDERS (OUTPATIENT)
Dept: ADMINISTRATIVE | Facility: HOSPITAL | Age: 61
End: 2021-03-29

## 2021-03-29 DIAGNOSIS — S86.902A: Primary | ICD-10-CM

## 2021-03-30 ENCOUNTER — HOSPITAL ENCOUNTER (OUTPATIENT)
Dept: CARDIOLOGY | Facility: HOSPITAL | Age: 61
Discharge: HOME | End: 2021-03-30
Admitting: NURSE PRACTITIONER

## 2021-03-30 DIAGNOSIS — S86.902A: ICD-10-CM

## 2021-03-30 PROCEDURE — 93971 EXTREMITY STUDY: CPT

## 2021-03-30 PROCEDURE — 93971 EXTREMITY STUDY: CPT | Performed by: RADIOLOGY

## 2021-05-20 ENCOUNTER — OFFICE VISIT (OUTPATIENT)
Dept: CARDIOLOGY | Facility: CLINIC | Age: 61
End: 2021-05-20

## 2021-05-20 VITALS
DIASTOLIC BLOOD PRESSURE: 63 MMHG | SYSTOLIC BLOOD PRESSURE: 111 MMHG | WEIGHT: 217.4 LBS | BODY MASS INDEX: 31.12 KG/M2 | TEMPERATURE: 97.7 F | HEIGHT: 70 IN | RESPIRATION RATE: 16 BRPM | HEART RATE: 66 BPM

## 2021-05-20 DIAGNOSIS — Z72.0 TOBACCO ABUSE: ICD-10-CM

## 2021-05-20 DIAGNOSIS — E78.5 DYSLIPIDEMIA: ICD-10-CM

## 2021-05-20 DIAGNOSIS — I25.10 ASCVD (ARTERIOSCLEROTIC CARDIOVASCULAR DISEASE): Primary | ICD-10-CM

## 2021-05-20 DIAGNOSIS — E11.9 TYPE 2 DIABETES MELLITUS WITHOUT COMPLICATION, WITHOUT LONG-TERM CURRENT USE OF INSULIN (HCC): ICD-10-CM

## 2021-05-20 PROCEDURE — 99213 OFFICE O/P EST LOW 20 MIN: CPT | Performed by: INTERNAL MEDICINE

## 2021-05-20 PROCEDURE — 93000 ELECTROCARDIOGRAM COMPLETE: CPT | Performed by: INTERNAL MEDICINE

## 2021-05-20 RX ORDER — CLOPIDOGREL BISULFATE 75 MG/1
75 TABLET ORAL DAILY
Qty: 30 TABLET | Refills: 2 | Status: SHIPPED | OUTPATIENT
Start: 2021-05-20 | End: 2021-08-23

## 2021-05-20 RX ORDER — LISINOPRIL 5 MG/1
5 TABLET ORAL DAILY
Qty: 90 TABLET | Refills: 2 | Status: SHIPPED | OUTPATIENT
Start: 2021-05-20 | End: 2022-05-24

## 2021-05-20 RX ORDER — ATORVASTATIN CALCIUM 80 MG/1
80 TABLET, FILM COATED ORAL DAILY
Qty: 30 TABLET | Refills: 2 | Status: SHIPPED | OUTPATIENT
Start: 2021-05-20 | End: 2021-08-23

## 2021-05-20 RX ORDER — PANTOPRAZOLE SODIUM 40 MG/1
40 TABLET, DELAYED RELEASE ORAL DAILY
COMMUNITY

## 2021-05-20 NOTE — PROGRESS NOTES
Robina Browne, LUCERO  Yury Thibodeaux  1960 05/20/2021    Patient Active Problem List   Diagnosis   • Precordial pain   • Dyslipidemia   • Palpitations   • ASCVD (arteriosclerotic cardiovascular disease), status post stenting of the left circumflex with 2.5×8 mm Alpine stent on 7/27/2016.   • Renal carcinoma, right, status post right partial nephrectomy in November 2017.       Dear Robina Browne, LUCERO:    Subjective     Yury Thibodeaux is a 60 y.o. male with the problems as listed above, presents    Chief complaint: Follow-up of coronary artery disease.    History of Present Illness: Mr. Thibodeaux is a pleasant 56-year-old  male with history of known coronary artery disease, status post stenting of the left circumflex coronary artery in July 2016, is here for regular cardiology follow-up.  On today's visit denies any complaints of chest pains or shortness of breath, palpitations, dizziness or syncope.  Overall he states he is doing well.  He still smokes about a pack a day.  He said he has tried nicotine patches for with no effect.  He does not want to try Chantix due to side effects.    No Known Allergies:      Current Outpatient Medications:   •  aspirin 81 MG tablet, Take 1 tablet by mouth Daily., Disp: 30 tablet, Rfl: 11  •  atorvastatin (LIPITOR) 80 MG tablet, TAKE 1 TABLET BY MOUTH DAILY., Disp: 30 tablet, Rfl: 2  •  clopidogrel (PLAVIX) 75 MG tablet, TAKE 1 TABLET BY MOUTH DAILY., Disp: 30 tablet, Rfl: 2  •  glipiZIDE (GLUCOTROL) 5 MG tablet, Take 5 mg by mouth 2 (Two) Times a Day Before Meals., Disp: , Rfl:   •  HYDROcodone-acetaminophen (NORCO)  MG per tablet, Take 1 tablet by mouth Every 6 (Six) Hours As Needed for Moderate Pain ., Disp: , Rfl:   •  loperamide (IMODIUM) 2 MG capsule, Take 2 mg by mouth 4 (Four) Times a Day As Needed for Diarrhea., Disp: , Rfl:   •  metFORMIN (GLUCOPHAGE) 1000 MG tablet, Take 500 mg by mouth 2 (Two) Times a Day With Meals., Disp: , Rfl:   •   "metoprolol tartrate (LOPRESSOR) 25 MG tablet, TAKE 1/2 TABLET BY MOUTH EVERY 12 (TWELVE) HOURS., Disp: 30 tablet, Rfl: 2  •  pantoprazole (PROTONIX) 40 MG EC tablet, Take 40 mg by mouth Daily., Disp: , Rfl:   •  lansoprazole (PREVACID) 15 MG capsule, Take 30 mg by mouth Daily., Disp: , Rfl:   •  lisinopril (PRINIVIL,ZESTRIL) 5 MG tablet, Take 1 tablet by mouth Daily., Disp: 90 tablet, Rfl: 2  •  nicotine (NICODERM CQ) 14 MG/24HR patch, Place 1 patch on the skin Daily., Disp: 30 patch, Rfl: 2  •  primidone (MYSOLINE) 50 MG tablet, Take 50 mg by mouth Take As Directed., Disp: , Rfl:   •  raNITIdine (ZANTAC) 150 MG tablet, TAKE 1 TABLET BY MOUTH DAILY, Disp: 30 tablet, Rfl: 2      The following portions of the patient's history were reviewed and updated as appropriate: allergies, current medications, past family history, past medical history, past social history, past surgical history and problem list.    Social History     Tobacco Use   • Smoking status: Current Every Day Smoker     Packs/day: 1.00     Years: 35.00     Pack years: 35.00     Types: Cigarettes     Last attempt to quit: 2/28/2018     Years since quitting: 3.2   • Smokeless tobacco: Never Used   Substance Use Topics   • Alcohol use: No   • Drug use: No       Review of Systems   Cardiovascular: Negative for chest pain, leg swelling and palpitations.   Respiratory: Negative for shortness of breath.      Objective   Vitals:    05/20/21 1545   BP: 111/63   Pulse: 66   Resp: 16   Temp: 97.7 °F (36.5 °C)   Weight: 98.6 kg (217 lb 6.4 oz)   Height: 177.8 cm (70\")     Body mass index is 31.19 kg/m².    Vitals reviewed.   Constitutional:       Appearance: Well-developed.   Eyes:      Conjunctiva/sclera: Conjunctivae normal.   HENT:      Head: Normocephalic.   Neck:      Thyroid: No thyromegaly.      Vascular: No JVD.      Trachea: No tracheal deviation.   Pulmonary:      Effort: No respiratory distress.      Breath sounds: Normal breath sounds. No wheezing. No " rales.   Cardiovascular:      PMI at left midclavicular line. Normal rate. Regular rhythm. Normal S1. Normal S2.      Murmurs: There is no murmur.      No gallop. No click. No rub.   Pulses:     Intact distal pulses.   Edema:     Peripheral edema absent.   Abdominal:      General: Bowel sounds are normal.      Palpations: Abdomen is soft. There is no abdominal mass.      Tenderness: There is no abdominal tenderness.   Musculoskeletal:      Cervical back: Normal range of motion and neck supple. Skin:     General: Skin is warm and dry.   Neurological:      Mental Status: Alert and oriented to person, place, and time.      Cranial Nerves: No cranial nerve deficit.         Lab Results   Component Value Date    CKTOTAL 101 07/25/2016     Lab Results   Component Value Date    WBC 18.80 (H) 11/01/2017    HGB 14.7 11/01/2017    HCT 42.8 11/01/2017     11/01/2017       Lab Results   Component Value Date    TRIG 162 (H) 06/12/2017    HDL 33 (L) 06/12/2017    LDL 39 06/12/2017      Lab Results   Component Value Date    BNP 18.0 07/25/2016           ECG 12 Lead    Date/Time: 5/20/2021 3:50 PM  Performed by: Chin Kathleen MD  Authorized by: Chin Kathleen MD   Comparison: compared with previous ECG from 5/27/2020  Similar to previous ECG  Rhythm: sinus rhythm  Conduction: conduction normal  ST Segments: ST segments normal  T Waves: T waves normal    Clinical impression: normal ECG              Assessment/Plan :   Diagnosis Plan   1. ASCVD (arteriosclerotic cardiovascular disease), status post stenting of the left circumflex with 2.5×8 mm Alpine stent on 7/27/2016.     2. Dyslipidemia  Lipid Panel, CMP   3. Tobacco abuse     4. Type 2 diabetes mellitus without complication, without long-term current use of insulin (CMS/[facility])          Recommendations:  1. Continue with aspirin, Plavix, atorvastatin, lisinopril and metoprolol tartrate at current doses.  2. Check fasting lipid panel and CMP.  3. Have reemphasized for him  to quit smoking.  He does not want to try Chantix.    Return in about 6 months (around 11/20/2021) for or sooner if needed.    As always, Robina Browne APRN  I appreciate very much the opportunity to participate in the cardiovascular care of your patients. Please do not hesitate to call me with any questions with regards to Yury Carlos Eduardo's evaluation and management.       With Best Regards,        Chin Kathleen MD, [facility]    Please note that portions of this note were completed with a voice recognition program.

## 2021-08-23 RX ORDER — ATORVASTATIN CALCIUM 80 MG/1
80 TABLET, FILM COATED ORAL DAILY
Qty: 30 TABLET | Refills: 2 | Status: SHIPPED | OUTPATIENT
Start: 2021-08-23 | End: 2021-11-24

## 2021-08-23 RX ORDER — CLOPIDOGREL BISULFATE 75 MG/1
75 TABLET ORAL DAILY
Qty: 30 TABLET | Refills: 2 | Status: SHIPPED | OUTPATIENT
Start: 2021-08-23 | End: 2021-12-23

## 2021-09-07 ENCOUNTER — LAB (OUTPATIENT)
Dept: LAB | Facility: HOSPITAL | Age: 61
End: 2021-09-07

## 2021-09-07 ENCOUNTER — TRANSCRIBE ORDERS (OUTPATIENT)
Dept: ADMINISTRATIVE | Facility: HOSPITAL | Age: 61
End: 2021-09-07

## 2021-09-07 DIAGNOSIS — Z11.52 ENCOUNTER FOR SCREENING FOR COVID-19: Primary | ICD-10-CM

## 2021-09-07 DIAGNOSIS — Z11.52 ENCOUNTER FOR SCREENING FOR COVID-19: ICD-10-CM

## 2021-09-07 PROCEDURE — U0004 COV-19 TEST NON-CDC HGH THRU: HCPCS | Performed by: FAMILY MEDICINE

## 2021-09-07 PROCEDURE — C9803 HOPD COVID-19 SPEC COLLECT: HCPCS

## 2021-09-08 LAB — SARS-COV-2 RNA NOSE QL NAA+PROBE: NOT DETECTED

## 2021-11-23 ENCOUNTER — OFFICE VISIT (OUTPATIENT)
Dept: CARDIOLOGY | Facility: CLINIC | Age: 61
End: 2021-11-23

## 2021-11-23 VITALS
TEMPERATURE: 96.6 F | RESPIRATION RATE: 16 BRPM | SYSTOLIC BLOOD PRESSURE: 114 MMHG | HEART RATE: 65 BPM | DIASTOLIC BLOOD PRESSURE: 71 MMHG | BODY MASS INDEX: 31.44 KG/M2 | WEIGHT: 219.6 LBS | HEIGHT: 70 IN

## 2021-11-23 DIAGNOSIS — Z72.0 TOBACCO ABUSE: ICD-10-CM

## 2021-11-23 DIAGNOSIS — I25.10 ASCVD (ARTERIOSCLEROTIC CARDIOVASCULAR DISEASE): Primary | ICD-10-CM

## 2021-11-23 DIAGNOSIS — E78.5 DYSLIPIDEMIA: ICD-10-CM

## 2021-11-23 PROCEDURE — 93000 ELECTROCARDIOGRAM COMPLETE: CPT | Performed by: INTERNAL MEDICINE

## 2021-11-23 PROCEDURE — 99213 OFFICE O/P EST LOW 20 MIN: CPT | Performed by: INTERNAL MEDICINE

## 2021-11-23 NOTE — PROGRESS NOTES
Robina Browne, LUCERO  Yury Thibodeaux  1960 11/23/2021    Patient Active Problem List   Diagnosis   • Precordial pain   • Dyslipidemia   • Palpitations   • ASCVD (arteriosclerotic cardiovascular disease), status post stenting of the left circumflex with 2.5×8 mm Alpine stent on 7/27/2016.   • Renal carcinoma, right, status post right partial nephrectomy in November 2017.       Dear Robina Browne, APRN:    Subjective     Yury Thibodeaux is a 61 y.o. male with the problems as listed above, presents    Chief Complaint   Patient presents with   • Coronary Artery Disease     6 MOS FOLLOW       History of Present Illness: Mr. Thibodeaux is a pleasant 61-year-old  male with history of known ASCVD, status post stenting of the left circumflex coronary artery in July 2016, is here for regular cardiology follow-up.  On today's visit he denies any complaints of chest pains or shortness of breath and overall feels pretty good.        No Known Allergies:      Current Outpatient Medications:   •  aspirin 81 MG tablet, Take 1 tablet by mouth Daily., Disp: 30 tablet, Rfl: 11  •  clopidogrel (PLAVIX) 75 MG tablet, TAKE 1 TABLET BY MOUTH DAILY., Disp: 30 tablet, Rfl: 2  •  glipiZIDE (GLUCOTROL) 5 MG tablet, Take 5 mg by mouth 2 (Two) Times a Day Before Meals., Disp: , Rfl:   •  HYDROcodone-acetaminophen (NORCO)  MG per tablet, Take 1 tablet by mouth Every 6 (Six) Hours As Needed for Moderate Pain ., Disp: , Rfl:   •  loperamide (IMODIUM) 2 MG capsule, Take 2 mg by mouth 4 (Four) Times a Day As Needed for Diarrhea., Disp: , Rfl:   •  metFORMIN (GLUCOPHAGE) 1000 MG tablet, Take 500 mg by mouth 2 (Two) Times a Day With Meals., Disp: , Rfl:   •  metoprolol tartrate (LOPRESSOR) 25 MG tablet, Take 1 tablet by mouth 2 (Two) Times a Day., Disp: 60 tablet, Rfl: 5  •  pantoprazole (PROTONIX) 40 MG EC tablet, Take 40 mg by mouth Daily., Disp: , Rfl:   •  atorvastatin (LIPITOR) 80 MG tablet, TAKE 1 TABLET BY MOUTH DAILY.,  "Disp: 30 tablet, Rfl: 2  •  lansoprazole (PREVACID) 15 MG capsule, Take 30 mg by mouth Daily., Disp: , Rfl:   •  lisinopril (PRINIVIL,ZESTRIL) 5 MG tablet, Take 1 tablet by mouth Daily., Disp: 90 tablet, Rfl: 2  •  nicotine (NICODERM CQ) 14 MG/24HR patch, Place 1 patch on the skin Daily., Disp: 30 patch, Rfl: 2  •  primidone (MYSOLINE) 50 MG tablet, Take 50 mg by mouth Take As Directed., Disp: , Rfl:   •  raNITIdine (ZANTAC) 150 MG tablet, TAKE 1 TABLET BY MOUTH DAILY, Disp: 30 tablet, Rfl: 2      The following portions of the patient's history were reviewed and updated as appropriate: allergies, current medications, past family history, past medical history, past social history, past surgical history and problem list.    Social History     Tobacco Use   • Smoking status: Current Every Day Smoker     Packs/day: 1.00     Years: 35.00     Pack years: 35.00     Types: Cigarettes     Last attempt to quit: 2/28/2018     Years since quitting: 3.7   • Smokeless tobacco: Never Used   Substance Use Topics   • Alcohol use: No   • Drug use: No       Review of Systems   Constitutional: Negative for chills and fever.   HENT: Negative for nosebleeds and sore throat.    Cardiovascular: Negative for chest pain, leg swelling and palpitations.   Respiratory: Negative for cough, hemoptysis, shortness of breath and wheezing.    Gastrointestinal: Negative for abdominal pain, hematemesis, hematochezia, melena, nausea and vomiting.   Genitourinary: Negative for dysuria and hematuria.   Neurological: Negative for headaches.       Objective   Vitals:    11/23/21 1535   BP: 114/71   Pulse: 65   Resp: 16   Temp: 96.6 °F (35.9 °C)   Weight: 99.6 kg (219 lb 9.6 oz)   Height: 177.8 cm (70\")     Body mass index is 31.51 kg/m².    Vitals reviewed.   Constitutional:       Appearance: Well-developed.   Eyes:      Conjunctiva/sclera: Conjunctivae normal.   HENT:      Head: Normocephalic.   Neck:      Thyroid: No thyromegaly.      Vascular: No JVD.    "   Trachea: No tracheal deviation.   Pulmonary:      Effort: No respiratory distress.      Breath sounds: Wheezing (Mild expiratory wheezing bilateral) present. No rales.   Cardiovascular:      PMI at left midclavicular line. Normal rate. Regular rhythm. Normal S1. Normal S2.      Murmurs: There is no murmur.      No gallop. No click. No rub.   Pulses:     Intact distal pulses.   Edema:     Peripheral edema absent.   Abdominal:      General: Bowel sounds are normal.      Palpations: Abdomen is soft. There is no abdominal mass.      Tenderness: There is no abdominal tenderness.   Musculoskeletal:      Cervical back: Normal range of motion and neck supple. Skin:     General: Skin is warm and dry.   Neurological:      Mental Status: Alert and oriented to person, place, and time.      Cranial Nerves: No cranial nerve deficit.       Lab Results   Component Value Date     (L) 11/01/2017    K 3.9 11/01/2017     11/01/2017    CO2 23.4 (L) 11/01/2017    BUN 14 11/01/2017    CREATININE 0.81 11/01/2017    GLUCOSE 195 (H) 11/01/2017    CALCIUM 9.7 11/01/2017    AST 22 11/01/2017    ALT 36 11/01/2017    ALKPHOS 133 (H) 11/01/2017     Lab Results   Component Value Date    CKTOTAL 101 07/25/2016     Lab Results   Component Value Date    WBC 18.80 (H) 11/01/2017    HGB 14.7 11/01/2017    HCT 42.8 11/01/2017     11/01/2017     No results found for: INR  No results found for: MG  Lab Results   Component Value Date    TRIG 162 (H) 06/12/2017    HDL 33 (L) 06/12/2017    LDL 39 06/12/2017      Lab Results   Component Value Date    BNP 18.0 07/25/2016         ECG 12 Lead    Date/Time: 11/23/2021 3:38 PM  Performed by: Chin Kathleen MD  Authorized by: Chin Kathleen MD   Comparison: compared with previous ECG from 5/20/2021  Similar to previous ECG  Rhythm: sinus rhythm  Conduction: conduction normal  ST Segments: ST segments normal    Clinical impression: normal ECG              Assessment/Plan :   Diagnosis Plan    1. ASCVD (arteriosclerotic cardiovascular disease), status post stenting of the left circumflex with 2.5×8 mm Alpine stent on 7/27/2016, clinically stable.     2. Dyslipidemia     3. Tobacco abuse         Recommendations:  1. Continue with aspirin, clopidogrel, atorvastatin and metoprolol at current dose.  2. I have strongly reemphasized the need to quit smoking.    Return in about 6 months (around 5/23/2022).    As always, Robina Browne APRN  I appreciate very much the opportunity to participate in the cardiovascular care of your patients. Please do not hesitate to call me with any questions with regards to Yury Thibodeaux evaluation and management.       With Best Regards,        Chin Kathleen MD, FACC    Please note that portions of this note were completed with a voice recognition program.

## 2021-11-24 RX ORDER — ATORVASTATIN CALCIUM 80 MG/1
80 TABLET, FILM COATED ORAL DAILY
Qty: 30 TABLET | Refills: 2 | Status: SHIPPED | OUTPATIENT
Start: 2021-11-24 | End: 2022-03-25

## 2021-12-23 RX ORDER — CLOPIDOGREL BISULFATE 75 MG/1
75 TABLET ORAL DAILY
Qty: 30 TABLET | Refills: 2 | Status: SHIPPED | OUTPATIENT
Start: 2021-12-23 | End: 2022-03-25

## 2022-03-25 RX ORDER — ATORVASTATIN CALCIUM 80 MG/1
80 TABLET, FILM COATED ORAL DAILY
Qty: 30 TABLET | Refills: 2 | Status: SHIPPED | OUTPATIENT
Start: 2022-03-25 | End: 2022-06-29

## 2022-03-25 RX ORDER — CLOPIDOGREL BISULFATE 75 MG/1
75 TABLET ORAL DAILY
Qty: 30 TABLET | Refills: 2 | Status: SHIPPED | OUTPATIENT
Start: 2022-03-25 | End: 2022-06-29

## 2022-05-24 ENCOUNTER — OFFICE VISIT (OUTPATIENT)
Dept: CARDIOLOGY | Facility: CLINIC | Age: 62
End: 2022-05-24

## 2022-05-24 VITALS
BODY MASS INDEX: 31.12 KG/M2 | DIASTOLIC BLOOD PRESSURE: 69 MMHG | SYSTOLIC BLOOD PRESSURE: 110 MMHG | HEART RATE: 62 BPM | RESPIRATION RATE: 16 BRPM | WEIGHT: 217.4 LBS | HEIGHT: 70 IN

## 2022-05-24 DIAGNOSIS — I25.10 ASCVD (ARTERIOSCLEROTIC CARDIOVASCULAR DISEASE): Primary | ICD-10-CM

## 2022-05-24 DIAGNOSIS — R00.2 PALPITATIONS: ICD-10-CM

## 2022-05-24 PROCEDURE — 99213 OFFICE O/P EST LOW 20 MIN: CPT | Performed by: PHYSICIAN ASSISTANT

## 2022-05-24 PROCEDURE — 93000 ELECTROCARDIOGRAM COMPLETE: CPT | Performed by: PHYSICIAN ASSISTANT

## 2022-05-24 NOTE — PROGRESS NOTES
Robina Browne, LUCERO  Yury Thibodeaux  1960 05/24/2022    Patient Active Problem List   Diagnosis   • Precordial pain   • Dyslipidemia   • Palpitations   • ASCVD (arteriosclerotic cardiovascular disease), status post stenting of the left circumflex with 2.5×8 mm Alpine stent on 7/27/2016.   • Renal carcinoma, right, status post right partial nephrectomy in November 2017.       Dear Robina Browne, LUCERO:    Subjective     History of Present Illness:    Chief Complaint   Patient presents with   • Coronary Artery Disease     6 mos follow   • Med Management     List provided       Yury Thibodeaux is a pleasant 61 y.o. male with a past medical history significant for ASCVD with stenting of LCX in July 2016. He also has diabetes mellitus, essential hypertension. He also smokes tobacco. He comes in for cardiology follow up.     Yury denies any issues with open questions. Patient denies any chest pain, shortness of breath, palpitations, dizziness, syncope or near syncope.      No Known Allergies:      Current Outpatient Medications:   •  aspirin 81 MG tablet, Take 1 tablet by mouth Daily., Disp: 30 tablet, Rfl: 11  •  atorvastatin (LIPITOR) 80 MG tablet, TAKE 1 TABLET BY MOUTH DAILY., Disp: 30 tablet, Rfl: 2  •  clopidogrel (PLAVIX) 75 MG tablet, TAKE 1 TABLET BY MOUTH DAILY., Disp: 30 tablet, Rfl: 2  •  glipiZIDE (GLUCOTROL) 5 MG tablet, Take 5 mg by mouth 2 (Two) Times a Day Before Meals., Disp: , Rfl:   •  HYDROcodone-acetaminophen (NORCO)  MG per tablet, Take 1 tablet by mouth Every 6 (Six) Hours As Needed for Moderate Pain ., Disp: , Rfl:   •  loperamide (IMODIUM) 2 MG capsule, Take 2 mg by mouth 4 (Four) Times a Day As Needed for Diarrhea., Disp: , Rfl:   •  metFORMIN (GLUCOPHAGE) 1000 MG tablet, Take 500 mg by mouth 2 (Two) Times a Day With Meals., Disp: , Rfl:   •  metoprolol tartrate (LOPRESSOR) 25 MG tablet, Take 1 tablet by mouth 2 (Two) Times a Day., Disp: 60 tablet, Rfl: 5  •  pantoprazole  "(PROTONIX) 40 MG EC tablet, Take 40 mg by mouth Daily., Disp: , Rfl:   •  lansoprazole (PREVACID) 15 MG capsule, Take 30 mg by mouth Daily., Disp: , Rfl:   •  nicotine (NICODERM CQ) 14 MG/24HR patch, Place 1 patch on the skin Daily., Disp: 30 patch, Rfl: 2  •  primidone (MYSOLINE) 50 MG tablet, Take 50 mg by mouth Take As Directed., Disp: , Rfl:   •  raNITIdine (ZANTAC) 150 MG tablet, TAKE 1 TABLET BY MOUTH DAILY, Disp: 30 tablet, Rfl: 2    The following portions of the patient's history were reviewed and updated as appropriate: allergies, current medications, past family history, past medical history, past social history, past surgical history and problem list.    Social History     Tobacco Use   • Smoking status: Current Every Day Smoker     Packs/day: 1.00     Years: 35.00     Pack years: 35.00     Types: Cigarettes     Last attempt to quit: 2018     Years since quittin.2   • Smokeless tobacco: Never Used   Substance Use Topics   • Alcohol use: No   • Drug use: No         Objective   Vitals:    22 1525   BP: 110/69   Pulse: 62   Resp: 16   Weight: 98.6 kg (217 lb 6.4 oz)   Height: 177.8 cm (70\")     Body mass index is 31.19 kg/m².    Constitutional:       General: Not in acute distress.     Appearance: Healthy appearance. Well-developed and not in distress. Not diaphoretic.   Eyes:      Conjunctiva/sclera: Conjunctivae normal.      Pupils: Pupils are equal, round, and reactive to light.   HENT:      Head: Normocephalic and atraumatic.   Neck:      Vascular: No carotid bruit or JVD.   Pulmonary:      Effort: Pulmonary effort is normal. No respiratory distress.      Breath sounds: Normal breath sounds.   Cardiovascular:      Normal rate. Regular rhythm.   Skin:     General: Skin is cool.   Neurological:      Mental Status: Alert, oriented to person, place, and time and oriented to person, place and time.         Lab Results   Component Value Date     (L) 2017    K 3.9 2017     " 11/01/2017    CO2 23.4 (L) 11/01/2017    BUN 14 11/01/2017    CREATININE 0.81 11/01/2017    GLUCOSE 195 (H) 11/01/2017    CALCIUM 9.7 11/01/2017    AST 22 11/01/2017    ALT 36 11/01/2017    ALKPHOS 133 (H) 11/01/2017     Lab Results   Component Value Date    CKTOTAL 101 07/25/2016     Lab Results   Component Value Date    WBC 18.80 (H) 11/01/2017    HGB 14.7 11/01/2017    HCT 42.8 11/01/2017     11/01/2017     No results found for: INR  No results found for: MG  Lab Results   Component Value Date    TRIG 162 (H) 06/12/2017    HDL 33 (L) 06/12/2017    LDL 39 06/12/2017      Lab Results   Component Value Date    BNP 18.0 07/25/2016       During this visit the following were done:  Labs Reviewed []    Labs Ordered []    Radiology Reports Reviewed []    Radiology Ordered []    PCP Records Reviewed []    Referring Provider Records Reviewed []    ER Records Reviewed []    Hospital Records Reviewed []    History Obtained From Family []    Radiology Images Reviewed []    Other Reviewed []    Records Requested []         ECG 12 Lead    Date/Time: 5/24/2022 3:26 PM  Performed by: Ivan Jaimes PA-C  Authorized by: Ivan Jaimes PA-C   Comparison: compared with previous ECG   Similar to previous ECG  Rhythm: sinus rhythm  Conduction: conduction normal  ST Segments: ST segments normal    Clinical impression: normal ECG            Assessment & Plan    Diagnosis Plan   1. ASCVD (arteriosclerotic cardiovascular disease), status post stenting of the left circumflex with 2.5×8 mm Alpine stent on 7/27/2016.     2. Palpitations              Recommendations:  1. ASCVD  1. No recent angina symptoms. Doing well clinically. I will continue aspirin, lipitor, plavix, metoprolol.   2. I requested recent labs from pcp.       No follow-ups on file.    As always, I appreciate very much the opportunity to participate in the cardiovascular care of your patients.      With Best Regards,    Ivan Jaimes PA-C

## 2022-05-25 ENCOUNTER — TELEPHONE (OUTPATIENT)
Dept: CARDIOLOGY | Facility: CLINIC | Age: 62
End: 2022-05-25

## 2022-05-25 NOTE — TELEPHONE ENCOUNTER
Requested.   ----- Message from Ivan Jaimes PA-C sent at 5/24/2022  3:42 PM EDT -----  Can we get recent labs from pcp?

## 2022-06-29 RX ORDER — CLOPIDOGREL BISULFATE 75 MG/1
75 TABLET ORAL DAILY
Qty: 30 TABLET | Refills: 2 | Status: SHIPPED | OUTPATIENT
Start: 2022-06-29 | End: 2022-09-29

## 2022-06-29 RX ORDER — ATORVASTATIN CALCIUM 80 MG/1
80 TABLET, FILM COATED ORAL DAILY
Qty: 30 TABLET | Refills: 2 | Status: SHIPPED | OUTPATIENT
Start: 2022-06-29 | End: 2022-09-29

## 2022-09-29 RX ORDER — ATORVASTATIN CALCIUM 80 MG/1
80 TABLET, FILM COATED ORAL DAILY
Qty: 30 TABLET | Refills: 2 | Status: SHIPPED | OUTPATIENT
Start: 2022-09-29 | End: 2022-12-27

## 2022-09-29 RX ORDER — CLOPIDOGREL BISULFATE 75 MG/1
75 TABLET ORAL DAILY
Qty: 30 TABLET | Refills: 2 | Status: SHIPPED | OUTPATIENT
Start: 2022-09-29 | End: 2022-12-27

## 2022-11-28 ENCOUNTER — OFFICE VISIT (OUTPATIENT)
Dept: CARDIOLOGY | Facility: CLINIC | Age: 62
End: 2022-11-28

## 2022-11-28 VITALS
BODY MASS INDEX: 31.18 KG/M2 | OXYGEN SATURATION: 95 % | DIASTOLIC BLOOD PRESSURE: 61 MMHG | WEIGHT: 217.8 LBS | HEART RATE: 64 BPM | SYSTOLIC BLOOD PRESSURE: 110 MMHG | HEIGHT: 70 IN

## 2022-11-28 DIAGNOSIS — R00.2 PALPITATIONS: ICD-10-CM

## 2022-11-28 DIAGNOSIS — I25.10 ASCVD (ARTERIOSCLEROTIC CARDIOVASCULAR DISEASE): Primary | ICD-10-CM

## 2022-11-28 DIAGNOSIS — E78.5 DYSLIPIDEMIA: ICD-10-CM

## 2022-11-28 PROCEDURE — 93000 ELECTROCARDIOGRAM COMPLETE: CPT | Performed by: PHYSICIAN ASSISTANT

## 2022-11-28 PROCEDURE — 99213 OFFICE O/P EST LOW 20 MIN: CPT | Performed by: PHYSICIAN ASSISTANT

## 2022-11-28 NOTE — PROGRESS NOTES
Robina Browne, LUCERO  Yury Thibodeaux  1960 11/28/2022    Patient Active Problem List   Diagnosis   • Precordial pain   • Dyslipidemia   • Palpitations   • ASCVD (arteriosclerotic cardiovascular disease), status post stenting of the left circumflex with 2.5×8 mm Alpine stent on 7/27/2016.   • Renal carcinoma, right, status post right partial nephrectomy in November 2017.       Dear Robina Browne, LUCERO:    Subjective     History of Present Illness:    Chief Complaint   Patient presents with   • Follow-up     ROUTINE       Yury Thibodeaux is a pleasant 62 y.o. male with a past medical history significant for ASCVD with stenting of LCX in July 2016. He also has diabetes mellitus, essential hypertension. He also smokes tobacco. He comes in for cardiology follow up.     Patient denies any chest pain, shortness of breath, palpitations, dizziness, syncope or near syncope.     No Known Allergies:      Current Outpatient Medications:   •  aspirin 81 MG tablet, Take 1 tablet by mouth Daily., Disp: 30 tablet, Rfl: 11  •  atorvastatin (LIPITOR) 80 MG tablet, TAKE 1 TABLET BY MOUTH DAILY., Disp: 30 tablet, Rfl: 2  •  clopidogrel (PLAVIX) 75 MG tablet, TAKE 1 TABLET BY MOUTH DAILY., Disp: 30 tablet, Rfl: 2  •  glipiZIDE (GLUCOTROL) 5 MG tablet, Take 5 mg by mouth 2 (Two) Times a Day Before Meals., Disp: , Rfl:   •  HYDROcodone-acetaminophen (NORCO)  MG per tablet, Take 1 tablet by mouth Every 6 (Six) Hours As Needed for Moderate Pain ., Disp: , Rfl:   •  lansoprazole (PREVACID) 15 MG capsule, Take 30 mg by mouth Daily., Disp: , Rfl:   •  loperamide (IMODIUM) 2 MG capsule, Take 2 mg by mouth 4 (Four) Times a Day As Needed for Diarrhea., Disp: , Rfl:   •  metFORMIN (GLUCOPHAGE) 1000 MG tablet, Take 500 mg by mouth 2 (Two) Times a Day With Meals., Disp: , Rfl:   •  metoprolol tartrate (LOPRESSOR) 25 MG tablet, Take 1 tablet by mouth 2 (Two) Times a Day., Disp: 60 tablet, Rfl: 5  •  nicotine (NICODERM CQ) 14 MG/24HR  "patch, Place 1 patch on the skin Daily., Disp: 30 patch, Rfl: 2  •  pantoprazole (PROTONIX) 40 MG EC tablet, Take 40 mg by mouth Daily., Disp: , Rfl:   •  primidone (MYSOLINE) 50 MG tablet, Take 50 mg by mouth Take As Directed., Disp: , Rfl:   •  raNITIdine (ZANTAC) 150 MG tablet, TAKE 1 TABLET BY MOUTH DAILY, Disp: 30 tablet, Rfl: 2    The following portions of the patient's history were reviewed and updated as appropriate: allergies, current medications, past family history, past medical history, past social history, past surgical history and problem list.    Social History     Tobacco Use   • Smoking status: Every Day     Packs/day: 1.00     Years: 35.00     Pack years: 35.00     Types: Cigarettes     Last attempt to quit: 2018     Years since quittin.7   • Smokeless tobacco: Never   Substance Use Topics   • Alcohol use: No   • Drug use: No         Objective   Vitals:    22 1519   BP: 110/61   Pulse: 64   SpO2: 95%   Weight: 98.8 kg (217 lb 12.8 oz)   Height: 177.8 cm (70\")     Body mass index is 31.25 kg/m².    Constitutional:       General: Not in acute distress.     Appearance: Healthy appearance. Well-developed and not in distress. Not diaphoretic.   Eyes:      Conjunctiva/sclera: Conjunctivae normal.      Pupils: Pupils are equal, round, and reactive to light.   HENT:      Head: Normocephalic and atraumatic.   Neck:      Vascular: No carotid bruit or JVD.   Pulmonary:      Effort: Pulmonary effort is normal. No respiratory distress.      Breath sounds: Normal breath sounds.   Cardiovascular:      Normal rate. Regular rhythm.   Skin:     General: Skin is cool.   Neurological:      Mental Status: Alert, oriented to person, place, and time and oriented to person, place and time.         Lab Results   Component Value Date     (L) 2017    K 3.9 2017     2017    CO2 23.4 (L) 2017    BUN 14 2017    CREATININE 0.81 2017    GLUCOSE 195 (H) 2017    " CALCIUM 9.7 11/01/2017    AST 22 11/01/2017    ALT 36 11/01/2017    ALKPHOS 133 (H) 11/01/2017     Lab Results   Component Value Date    CKTOTAL 101 07/25/2016     Lab Results   Component Value Date    WBC 18.80 (H) 11/01/2017    HGB 14.7 11/01/2017    HCT 42.8 11/01/2017     11/01/2017     No results found for: INR  No results found for: MG  Lab Results   Component Value Date    TRIG 162 (H) 06/12/2017    HDL 33 (L) 06/12/2017    LDL 39 06/12/2017      Lab Results   Component Value Date    BNP 18.0 07/25/2016       During this visit the following were done:  Labs Reviewed []    Labs Ordered []    Radiology Reports Reviewed []    Radiology Ordered []    PCP Records Reviewed []    Referring Provider Records Reviewed []    ER Records Reviewed []    Hospital Records Reviewed []    History Obtained From Family []    Radiology Images Reviewed []    Other Reviewed []    Records Requested []         ECG 12 Lead    Date/Time: 11/28/2022 3:18 PM  Performed by: Ivan Jaimes PA-C  Authorized by: Ivan Jaimes PA-C   Comparison: compared with previous ECG   Similar to previous ECG  Rhythm: sinus rhythm  ST Segments: ST segments normal    Clinical impression: normal ECG            Assessment & Plan    Diagnosis Plan   1. ASCVD (arteriosclerotic cardiovascular disease), status post stenting of the left circumflex with 2.5×8 mm Alpine stent on 7/27/2016.  Comprehensive Metabolic Panel    CBC (No Diff)    Lipid Panel    TSH      2. Dyslipidemia  Comprehensive Metabolic Panel    CBC (No Diff)    Lipid Panel    TSH      3. Palpitations  Comprehensive Metabolic Panel    CBC (No Diff)    Lipid Panel    TSH               Recommendations:  1. ASCVD  a. Clinically stable no recent anginal symptoms.  Continue aspirin, Lipitor, Plavix, metoprolol.  b. I will check a CMP, CBC, TSH, and lipid panel.    No follow-ups on file.    As always, I appreciate very much the opportunity to participate in the cardiovascular care of your  patients.      With Best Regards,    Ivan Jaimes PA-C

## 2022-12-27 RX ORDER — ATORVASTATIN CALCIUM 80 MG/1
80 TABLET, FILM COATED ORAL DAILY
Qty: 30 TABLET | Refills: 2 | Status: SHIPPED | OUTPATIENT
Start: 2022-12-27 | End: 2023-04-03 | Stop reason: SDUPTHER

## 2022-12-27 RX ORDER — CLOPIDOGREL BISULFATE 75 MG/1
75 TABLET ORAL DAILY
Qty: 30 TABLET | Refills: 2 | Status: SHIPPED | OUTPATIENT
Start: 2022-12-27 | End: 2023-04-03 | Stop reason: SDUPTHER

## 2023-04-03 RX ORDER — CLOPIDOGREL BISULFATE 75 MG/1
75 TABLET ORAL DAILY
Qty: 30 TABLET | Refills: 2 | OUTPATIENT
Start: 2023-04-03

## 2023-04-03 RX ORDER — ATORVASTATIN CALCIUM 80 MG/1
80 TABLET, FILM COATED ORAL DAILY
Qty: 30 TABLET | Refills: 2 | OUTPATIENT
Start: 2023-04-03

## 2023-04-04 RX ORDER — ATORVASTATIN CALCIUM 80 MG/1
80 TABLET, FILM COATED ORAL DAILY
Qty: 30 TABLET | Refills: 2 | Status: SHIPPED | OUTPATIENT
Start: 2023-04-04

## 2023-04-04 RX ORDER — CLOPIDOGREL BISULFATE 75 MG/1
75 TABLET ORAL DAILY
Qty: 30 TABLET | Refills: 2 | Status: SHIPPED | OUTPATIENT
Start: 2023-04-04

## 2023-08-28 ENCOUNTER — OFFICE VISIT (OUTPATIENT)
Dept: CARDIOLOGY | Facility: CLINIC | Age: 63
End: 2023-08-28
Payer: COMMERCIAL

## 2023-08-28 VITALS
WEIGHT: 207.6 LBS | RESPIRATION RATE: 16 BRPM | BODY MASS INDEX: 29.72 KG/M2 | DIASTOLIC BLOOD PRESSURE: 67 MMHG | HEIGHT: 70 IN | HEART RATE: 71 BPM | OXYGEN SATURATION: 97 % | SYSTOLIC BLOOD PRESSURE: 125 MMHG

## 2023-08-28 DIAGNOSIS — E78.5 DYSLIPIDEMIA: ICD-10-CM

## 2023-08-28 DIAGNOSIS — I25.10 ASCVD (ARTERIOSCLEROTIC CARDIOVASCULAR DISEASE): Primary | ICD-10-CM

## 2023-08-28 PROCEDURE — 93000 ELECTROCARDIOGRAM COMPLETE: CPT | Performed by: PHYSICIAN ASSISTANT

## 2023-08-28 PROCEDURE — 99213 OFFICE O/P EST LOW 20 MIN: CPT | Performed by: PHYSICIAN ASSISTANT

## 2023-08-28 NOTE — PROGRESS NOTES
Robina Browne APRN  Yury Thibodeaux  1960 08/28/2023    Patient Active Problem List   Diagnosis    Precordial pain    Dyslipidemia    Palpitations    ASCVD (arteriosclerotic cardiovascular disease), status post stenting of the left circumflex with 2.5x8 mm Alpine stent on 7/27/2016.    Renal carcinoma, right, status post right partial nephrectomy in November 2017.       Dear Robina Browne APRN:    Subjective     History of Present Illness:    Chief Complaint   Patient presents with    Follow-up     9 mos    Med Management     verbal       Yury Thibodeaux is a pleasant 63 y.o. male with a past medical history significant for ASCVD with stenting of LCX in July 2016. He also has diabetes mellitus, essential hypertension. He also smokes tobacco. He comes in for cardiology follow up.      Patient denies any chest pain, shortness of breath, palpitations, dizziness, syncope or near syncope.  I did review labs drawn in April which did show excellently controlled cholesterol with LDL of 36 and stable renal function.  I did explain to him he had low vitamin D levels.    No Known Allergies:      Current Outpatient Medications:     aspirin 81 MG tablet, Take 1 tablet by mouth Daily., Disp: 30 tablet, Rfl: 11    atorvastatin (LIPITOR) 80 MG tablet, TAKE ONE TABLET BY MOUTH DAILY, Disp: 30 tablet, Rfl: 2    clopidogrel (PLAVIX) 75 MG tablet, TAKE ONE TABLET BY MOUTH DAILY, Disp: 30 tablet, Rfl: 2    glipiZIDE (GLUCOTROL) 5 MG tablet, Take 2 tablets by mouth 2 (Two) Times a Day Before Meals., Disp: , Rfl:     HYDROcodone-acetaminophen (NORCO)  MG per tablet, Take 1 tablet by mouth Every 6 (Six) Hours As Needed for Moderate Pain., Disp: , Rfl:     lansoprazole (PREVACID) 15 MG capsule, Take 2 capsules by mouth Daily., Disp: , Rfl:     loperamide (IMODIUM) 2 MG capsule, Take 1 capsule by mouth 4 (Four) Times a Day As Needed for Diarrhea., Disp: , Rfl:     metFORMIN (GLUCOPHAGE) 1000 MG tablet, Take 0.5 tablets by  "mouth 2 (Two) Times a Day With Meals., Disp: , Rfl:     metoprolol tartrate (LOPRESSOR) 25 MG tablet, TAKE ONE TABLET BY MOUTH TWO TIMES A DAY, Disp: 60 tablet, Rfl: 5    nicotine (NICODERM CQ) 14 MG/24HR patch, Place 1 patch on the skin Daily., Disp: 30 patch, Rfl: 2    pantoprazole (PROTONIX) 40 MG EC tablet, Take 1 tablet by mouth Daily., Disp: , Rfl:     primidone (MYSOLINE) 50 MG tablet, Take 1 tablet by mouth Take As Directed., Disp: , Rfl:     raNITIdine (ZANTAC) 150 MG tablet, TAKE 1 TABLET BY MOUTH DAILY, Disp: 30 tablet, Rfl: 2    The following portions of the patient's history were reviewed and updated as appropriate: allergies, current medications, past family history, past medical history, past social history, past surgical history and problem list.    Social History     Tobacco Use    Smoking status: Every Day     Packs/day: 1.00     Years: 35.00     Pack years: 35.00     Types: Cigarettes     Last attempt to quit: 2018     Years since quittin.4    Smokeless tobacco: Never   Substance Use Topics    Alcohol use: No    Drug use: No         Objective   Vitals:    23 1525   BP: 125/67   Pulse: 71   Resp: 16   SpO2: 97%   Weight: 94.2 kg (207 lb 9.6 oz)   Height: 177.8 cm (70\")     Body mass index is 29.79 kg/mý.    Constitutional:       General: Not in acute distress.     Appearance: Healthy appearance. Well-developed and not in distress. Not diaphoretic.   Eyes:      Conjunctiva/sclera: Conjunctivae normal.      Pupils: Pupils are equal, round, and reactive to light.   HENT:      Head: Normocephalic and atraumatic.   Neck:      Vascular: No carotid bruit or JVD.   Pulmonary:      Effort: Pulmonary effort is normal. No respiratory distress.      Breath sounds: Normal breath sounds.   Cardiovascular:      Normal rate. Regular rhythm.   Edema:     Peripheral edema absent.   Skin:     General: Skin is cool.   Neurological:      Mental Status: Alert, oriented to person, place, and time and " oriented to person, place and time.       Lab Results   Component Value Date     (L) 11/01/2017    K 3.9 11/01/2017     11/01/2017    CO2 23.4 (L) 11/01/2017    BUN 14 11/01/2017    CREATININE 0.81 11/01/2017    GLUCOSE 195 (H) 11/01/2017    CALCIUM 9.7 11/01/2017    AST 22 11/01/2017    ALT 36 11/01/2017    ALKPHOS 133 (H) 11/01/2017     Lab Results   Component Value Date    CKTOTAL 101 07/25/2016     Lab Results   Component Value Date    WBC 18.80 (H) 11/01/2017    HGB 14.7 11/01/2017    HCT 42.8 11/01/2017     11/01/2017     No results found for: INR  No results found for: MG  Lab Results   Component Value Date    TRIG 162 (H) 06/12/2017    HDL 33 (L) 06/12/2017    LDL 39 06/12/2017      Lab Results   Component Value Date    BNP 18.0 07/25/2016       During this visit the following were done:  Labs Reviewed []    Labs Ordered []    Radiology Reports Reviewed []    Radiology Ordered []    PCP Records Reviewed []    Referring Provider Records Reviewed []    ER Records Reviewed []    Hospital Records Reviewed []    History Obtained From Family []    Radiology Images Reviewed []    Other Reviewed []    Records Requested []         ECG 12 Lead    Date/Time: 8/28/2023 3:25 PM  Performed by: Ivan Jaimes PA-C  Authorized by: Ivan Jaimes PA-C   Comparison: compared with previous ECG   Similar to previous ECG  Rhythm: sinus rhythm  Conduction: conduction normal  ST Segments: ST segments normal    Clinical impression: normal ECG        Assessment & Plan    Diagnosis Plan   1. ASCVD (arteriosclerotic cardiovascular disease), status post stenting of the left circumflex with 2.5x8 mm Alpine stent on 7/27/2016.  ECG 12 Lead      2. Dyslipidemia                 Recommendations:  ASCVD  Denies any anginal symptoms I will continue current GDMT with aspirin, Plavix, Lipitor, metoprolol.  Tobacco abuse  This does complicate all aspects of his care and increases the risk of recurrent ASCVD.  I did  recommend for him to stop.    Return in about 1 year (around 8/28/2024).    As always, I appreciate very much the opportunity to participate in the cardiovascular care of your patients.      With Best Regards,    Ivan Jaimes PA-C

## 2023-11-06 RX ORDER — CLOPIDOGREL BISULFATE 75 MG/1
TABLET ORAL
Qty: 30 TABLET | Refills: 2 | Status: SHIPPED | OUTPATIENT
Start: 2023-11-06

## 2023-11-06 RX ORDER — ATORVASTATIN CALCIUM 80 MG/1
TABLET, FILM COATED ORAL
Qty: 30 TABLET | Refills: 3 | Status: SHIPPED | OUTPATIENT
Start: 2023-11-06

## 2024-02-05 RX ORDER — CLOPIDOGREL BISULFATE 75 MG/1
TABLET ORAL
Qty: 30 TABLET | Refills: 2 | Status: SHIPPED | OUTPATIENT
Start: 2024-02-05

## 2024-03-18 NOTE — PROGRESS NOTES
Garrison Hayes MD  Yury Thibodeaux  1960 11/14/2019    Patient Active Problem List   Diagnosis   • Precordial pain   • Dyslipidemia   • Palpitations   • ASCVD (arteriosclerotic cardiovascular disease), status post stenting of the left circumflex with 2.5×8 mm Alpine stent on 7/27/2016.   • Renal carcinoma, right, status post right partial nephrectomy in November 2017.       Dear Garrison Hayes MD:    Subjective     History of Present Illness:    Chief Complaint   Patient presents with   • Coronary Artery Disease     6 mos follow up   • Palpitations     occas   • Shortness of Breath     increased with sinus flare up   • Med Management     list provided       Yury Thibodeaux is a pleasant 59 y.o. male with a past medical history significant for ASCVD, status post stenting of the mid and distal left circumflex coronary artery in July 2016, is here for a cardiology follow up.     Patient reports that he has been doing well. Patient denies any chest pain, shortness of breath, palpitations, dizziness, syncope or near syncope. He does report that he is concerned for his son who is into IV drug abuse and recently was in the ER for this.       No Known Allergies:      Current Outpatient Medications:   •  aspirin 81 MG tablet, Take 1 tablet by mouth Daily., Disp: 30 tablet, Rfl: 11  •  atorvastatin (LIPITOR) 80 MG tablet, Take 1 tablet by mouth Daily., Disp: 30 tablet, Rfl: 6  •  clopidogrel (PLAVIX) 75 MG tablet, Take 1 tablet by mouth Daily., Disp: 30 tablet, Rfl: 4  •  glipiZIDE (GLUCOTROL) 5 MG tablet, Take 5 mg by mouth 2 (Two) Times a Day Before Meals., Disp: , Rfl:   •  HYDROcodone-acetaminophen (NORCO)  MG per tablet, Take 1 tablet by mouth Every 6 (Six) Hours As Needed for Moderate Pain ., Disp: , Rfl:   •  lansoprazole (PREVACID) 15 MG capsule, Take 15 mg by mouth Daily., Disp: , Rfl:   •  loperamide (IMODIUM) 2 MG capsule, Take 2 mg by mouth 4 (Four) Times a Day As Needed for Diarrhea.,  Rx request for: insulin aspart (NovoLOG) 100 UNIT/ML injectable solution     Last Sent- 3/14/2024, 30 mL, 0 refills.     Last OV- 5/22/2023  Next OV- 4/9/2024    Labs: OVER DUE for a Lipid Panel.      "Disp: , Rfl:   •  metFORMIN (GLUCOPHAGE) 1000 MG tablet, Take 500 mg by mouth 2 (Two) Times a Day With Meals., Disp: , Rfl:   •  metoprolol tartrate (LOPRESSOR) 25 MG tablet, TAKE 1/2 TABLET BY MOUTH EVERY 12 HOURS, Disp: 30 tablet, Rfl: 3  •  lisinopril (PRINIVIL,ZESTRIL) 5 MG tablet, Take 1 tablet by mouth Daily., Disp: 30 tablet, Rfl: 5  •  nicotine (NICODERM CQ) 14 MG/24HR patch, Place 1 patch on the skin Daily., Disp: 30 patch, Rfl: 2  •  primidone (MYSOLINE) 50 MG tablet, Take 50 mg by mouth Take As Directed., Disp: , Rfl:   •  raNITIdine (ZANTAC) 150 MG tablet, TAKE 1 TABLET BY MOUTH DAILY, Disp: 30 tablet, Rfl: 2    The following portions of the patient's history were reviewed and updated as appropriate: allergies, current medications, past family history, past medical history, past social history, past surgical history and problem list.    Social History     Tobacco Use   • Smoking status: Current Every Day Smoker     Packs/day: 1.00     Years: 35.00     Pack years: 35.00     Types: Cigarettes     Last attempt to quit: 2018     Years since quittin.7   • Smokeless tobacco: Never Used   Substance Use Topics   • Alcohol use: No   • Drug use: No       Review of Systems   Constitution: Negative for weakness and malaise/fatigue.   HENT: Positive for congestion.    Cardiovascular: Positive for palpitations. Negative for chest pain, dyspnea on exertion, irregular heartbeat and leg swelling.   Respiratory: Positive for shortness of breath. Negative for cough.    Hematologic/Lymphatic: Negative for bleeding problem. Does not bruise/bleed easily.   Gastrointestinal: Negative for nausea and vomiting.       Objective   Vitals:    19 1512   BP: 113/74   Pulse: 63   Resp: 16   Weight: 103 kg (226 lb 12.8 oz)   Height: 177.8 cm (70\")     Body mass index is 32.54 kg/m².    Physical Exam   Constitutional: He is oriented to person, place, and time. He appears well-developed and well-nourished. No distress. "   HENT:   Head: Normocephalic and atraumatic.   Cardiovascular: Normal rate, regular rhythm and normal heart sounds.   Pulmonary/Chest: Effort normal and breath sounds normal. No respiratory distress.   Musculoskeletal: He exhibits no edema.   Neurological: He is alert and oriented to person, place, and time.   Skin: He is not diaphoretic.       Lab Results   Component Value Date     (L) 11/01/2017    K 3.9 11/01/2017     11/01/2017    CO2 23.4 (L) 11/01/2017    BUN 14 11/01/2017    CREATININE 0.81 11/01/2017    GLUCOSE 195 (H) 11/01/2017    CALCIUM 9.7 11/01/2017    AST 22 11/01/2017    ALT 36 11/01/2017    ALKPHOS 133 (H) 11/01/2017     Lab Results   Component Value Date    CKTOTAL 101 07/25/2016     Lab Results   Component Value Date    WBC 18.80 (H) 11/01/2017    HGB 14.7 11/01/2017    HCT 42.8 11/01/2017     11/01/2017     No results found for: INR  No results found for: MG  Lab Results   Component Value Date    TRIG 162 (H) 06/12/2017    HDL 33 (L) 06/12/2017    LDL 39 06/12/2017      Lab Results   Component Value Date    BNP 18.0 07/25/2016       During this visit the following were done:  Labs Reviewed [x]    Labs Ordered []    Radiology Reports Reviewed [x]    Radiology Ordered []    PCP Records Reviewed []    Referring Provider Records Reviewed []    ER Records Reviewed []    Hospital Records Reviewed []    History Obtained From Family []    Radiology Images Reviewed []    Other Reviewed []    Records Requested []         ECG 12 Lead  Date/Time: 11/14/2019 3:14 PM  Performed by: Ivan Jaimes PA-C  Authorized by: Ivan Jaimes PA-C   Comparison: compared with previous ECG   Similar to previous ECG  Rhythm: sinus rhythm  Conduction: conduction normal  Q waves: V1      Clinical impression: normal ECG            Assessment/Plan    Diagnosis Plan   1. ASCVD (arteriosclerotic cardiovascular disease), status post stenting of the left circumflex with 2.5×8 mm Alpine stent on 7/27/2016.      2. Palpitations     3. Dyslipidemia  Lipid Panel    Comprehensive Metabolic Panel   4. Renal carcinoma, right, status post right partial nephrectomy in November 2017.            Recommendations:  1. Patient is stable from cardiac standpoint.  I will check lipid panel and CMP and emphasized the importance of routine blood work.  Otherwise continue aspirin, Lipitor, Plavix, lisinopril, Toprol tartrate.    Return in about 6 months (around 5/14/2020).    As always, I appreciate very much the opportunity to participate in the cardiovascular care of your patients.      With Best Regards,    Ivan Jaimes PA-C

## 2024-04-22 ENCOUNTER — HOSPITAL ENCOUNTER (OUTPATIENT)
Dept: GENERAL RADIOLOGY | Facility: HOSPITAL | Age: 64
Discharge: HOME | End: 2024-04-22
Admitting: PSYCHIATRY & NEUROLOGY
Payer: COMMERCIAL

## 2024-04-22 ENCOUNTER — TRANSCRIBE ORDERS (OUTPATIENT)
Dept: ADMINISTRATIVE | Facility: HOSPITAL | Age: 64
End: 2024-04-22
Payer: COMMERCIAL

## 2024-04-22 DIAGNOSIS — M54.9 BACK PAIN, UNSPECIFIED BACK LOCATION, UNSPECIFIED BACK PAIN LATERALITY, UNSPECIFIED CHRONICITY: ICD-10-CM

## 2024-04-22 DIAGNOSIS — M54.9 BACK PAIN, UNSPECIFIED BACK LOCATION, UNSPECIFIED BACK PAIN LATERALITY, UNSPECIFIED CHRONICITY: Primary | ICD-10-CM

## 2024-04-22 PROCEDURE — 72202 X-RAY EXAM SI JOINTS 3/> VWS: CPT

## 2024-04-22 PROCEDURE — 72202 X-RAY EXAM SI JOINTS 3/> VWS: CPT | Performed by: RADIOLOGY

## 2024-04-22 PROCEDURE — 72110 X-RAY EXAM L-2 SPINE 4/>VWS: CPT | Performed by: RADIOLOGY

## 2024-04-22 PROCEDURE — 72110 X-RAY EXAM L-2 SPINE 4/>VWS: CPT

## 2024-04-29 ENCOUNTER — TRANSCRIBE ORDERS (OUTPATIENT)
Dept: ADMINISTRATIVE | Facility: HOSPITAL | Age: 64
End: 2024-04-29
Payer: COMMERCIAL

## 2024-04-29 DIAGNOSIS — M54.50 LOW BACK PAIN, UNSPECIFIED BACK PAIN LATERALITY, UNSPECIFIED CHRONICITY, UNSPECIFIED WHETHER SCIATICA PRESENT: Primary | ICD-10-CM

## 2024-05-02 RX ORDER — CLOPIDOGREL BISULFATE 75 MG/1
75 TABLET ORAL DAILY
Qty: 30 TABLET | Refills: 2 | Status: SHIPPED | OUTPATIENT
Start: 2024-05-02

## 2024-05-08 ENCOUNTER — HOSPITAL ENCOUNTER (OUTPATIENT)
Facility: HOSPITAL | Age: 64
Discharge: HOME | End: 2024-05-08
Admitting: PSYCHIATRY & NEUROLOGY
Payer: COMMERCIAL

## 2024-05-08 DIAGNOSIS — M54.50 LOW BACK PAIN, UNSPECIFIED BACK PAIN LATERALITY, UNSPECIFIED CHRONICITY, UNSPECIFIED WHETHER SCIATICA PRESENT: ICD-10-CM

## 2024-05-08 PROCEDURE — 72131 CT LUMBAR SPINE W/O DYE: CPT

## 2024-05-08 PROCEDURE — 72131 CT LUMBAR SPINE W/O DYE: CPT | Performed by: RADIOLOGY

## 2024-07-20 ENCOUNTER — HOSPITAL ENCOUNTER (EMERGENCY)
Facility: HOSPITAL | Age: 64
Discharge: HOME | End: 2024-07-20
Attending: STUDENT IN AN ORGANIZED HEALTH CARE EDUCATION/TRAINING PROGRAM
Payer: COMMERCIAL

## 2024-07-20 VITALS
HEART RATE: 60 BPM | TEMPERATURE: 98 F | WEIGHT: 215 LBS | SYSTOLIC BLOOD PRESSURE: 130 MMHG | DIASTOLIC BLOOD PRESSURE: 80 MMHG | RESPIRATION RATE: 16 BRPM | OXYGEN SATURATION: 99 % | HEIGHT: 70 IN | BODY MASS INDEX: 30.78 KG/M2

## 2024-07-20 DIAGNOSIS — T23.262A PARTIAL THICKNESS BURN OF BACK OF LEFT HAND, INITIAL ENCOUNTER: Primary | ICD-10-CM

## 2024-07-20 PROCEDURE — 99282 EMERGENCY DEPT VISIT SF MDM: CPT

## 2024-07-20 PROCEDURE — 90471 IMMUNIZATION ADMIN: CPT | Performed by: PHYSICIAN ASSISTANT

## 2024-07-20 PROCEDURE — 90715 TDAP VACCINE 7 YRS/> IM: CPT | Performed by: PHYSICIAN ASSISTANT

## 2024-07-20 PROCEDURE — 25010000002 TETANUS-DIPHTH-ACELL PERTUSSIS 5-2.5-18.5 LF-MCG/0.5 SUSPENSION PREFILLED SYRINGE: Performed by: PHYSICIAN ASSISTANT

## 2024-07-20 RX ADMIN — TETANUS TOXOID, REDUCED DIPHTHERIA TOXOID AND ACELLULAR PERTUSSIS VACCINE, ADSORBED 0.5 ML: 5; 2.5; 8; 8; 2.5 SUSPENSION INTRAMUSCULAR at 14:57

## 2024-07-20 RX ADMIN — MUPIROCIN 1 APPLICATION: 20 OINTMENT TOPICAL at 14:35

## 2024-07-20 NOTE — Clinical Note
[facility] EMERGENCY DEPARTMENT  [address] [facility] [facility] [address]  Phone: [phone]    Yury Thibodeaux was seen and treated in our emergency department on 7/20/2024.  He may return to work on 07/24/2024.         Thank you for choosing [facility].    Reyes Cueva PA-C
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
## 2024-08-09 RX ORDER — CLOPIDOGREL BISULFATE 75 MG/1
75 TABLET ORAL DAILY
Qty: 30 TABLET | Refills: 2 | Status: SHIPPED | OUTPATIENT
Start: 2024-08-09

## 2024-08-29 ENCOUNTER — OFFICE VISIT (OUTPATIENT)
Dept: CARDIOLOGY | Facility: CLINIC | Age: 64
End: 2024-08-29
Payer: COMMERCIAL

## 2024-08-29 VITALS
HEIGHT: 70 IN | HEART RATE: 80 BPM | OXYGEN SATURATION: 96 % | SYSTOLIC BLOOD PRESSURE: 108 MMHG | DIASTOLIC BLOOD PRESSURE: 70 MMHG | WEIGHT: 207 LBS | BODY MASS INDEX: 29.63 KG/M2

## 2024-08-29 DIAGNOSIS — I25.10 ASCVD (ARTERIOSCLEROTIC CARDIOVASCULAR DISEASE): Primary | ICD-10-CM

## 2024-08-29 DIAGNOSIS — E78.5 DYSLIPIDEMIA: ICD-10-CM

## 2024-08-29 PROCEDURE — 99214 OFFICE O/P EST MOD 30 MIN: CPT | Performed by: PHYSICIAN ASSISTANT

## 2024-08-29 PROCEDURE — 93000 ELECTROCARDIOGRAM COMPLETE: CPT | Performed by: PHYSICIAN ASSISTANT

## 2024-08-29 RX ORDER — CLOPIDOGREL BISULFATE 75 MG/1
75 TABLET ORAL DAILY
Qty: 90 TABLET | Refills: 2 | Status: SHIPPED | OUTPATIENT
Start: 2024-08-29

## 2024-08-29 RX ORDER — METOPROLOL TARTRATE 25 MG/1
25 TABLET, FILM COATED ORAL 2 TIMES DAILY
Qty: 180 TABLET | Refills: 2 | Status: SHIPPED | OUTPATIENT
Start: 2024-08-29

## 2024-08-29 RX ORDER — ATORVASTATIN CALCIUM 80 MG/1
80 TABLET, FILM COATED ORAL DAILY
Qty: 90 TABLET | Refills: 3 | Status: SHIPPED | OUTPATIENT
Start: 2024-08-29

## 2024-08-29 RX ORDER — ASPIRIN 81 MG/1
81 TABLET ORAL DAILY
Qty: 90 TABLET | Refills: 2 | Status: SHIPPED | OUTPATIENT
Start: 2024-08-29

## 2024-08-29 NOTE — PROGRESS NOTES
Sandra Mckeon, LUCERO  Yury Thibodeaux  1960 08/29/2024    Patient Active Problem List   Diagnosis    Precordial pain    Dyslipidemia    Palpitations    ASCVD (arteriosclerotic cardiovascular disease), status post stenting of the left circumflex with 2.5×8 mm Alpine stent on 7/27/2016.    Renal carcinoma, right, status post right partial nephrectomy in November 2017.       Dear Sandra Mckeon APRN:    Subjective     Follow-up  :    Chief Complaint   Patient presents with    Follow-up     routine       Yury Thibodeaux is a pleasant 64 y.o. male with a past medical history significant for ASCVD with stenting of LCX in July 2016. He also has diabetes mellitus, essential hypertension. He also smokes tobacco. He comes in for cardiology follow up.      Yury comes in today for follow up. He does report some lower leg pain in the medial side from the proximal portion down to his toes. He states that this pain is frequent but occurs at rest. He states that it occurs at rest and does not particularly worsen with exertion.  He denies any chest pains, shortness of breath, dizziness, or syncope.  He reports he did have arterial Doppler by PCP done which was unremarkable I do not have these results available to me    No Known Allergies:      Current Outpatient Medications:     aspirin 81 MG EC tablet, Take 1 tablet by mouth Daily., Disp: 90 tablet, Rfl: 2    atorvastatin (LIPITOR) 80 MG tablet, Take 1 tablet by mouth Daily., Disp: 90 tablet, Rfl: 3    clopidogrel (PLAVIX) 75 MG tablet, Take 1 tablet by mouth Daily., Disp: 90 tablet, Rfl: 2    glipiZIDE (GLUCOTROL) 5 MG tablet, Take 2 tablets by mouth 2 (Two) Times a Day Before Meals., Disp: , Rfl:     HYDROcodone-acetaminophen (NORCO)  MG per tablet, Take 1 tablet by mouth Every 6 (Six) Hours As Needed for Moderate Pain., Disp: , Rfl:     metFORMIN (GLUCOPHAGE) 1000 MG tablet, Take 0.5 tablets by mouth 2 (Two) Times a Day With Meals., Disp: , Rfl:      "metoprolol tartrate (LOPRESSOR) 25 MG tablet, Take 1 tablet by mouth 2 (Two) Times a Day., Disp: 180 tablet, Rfl: 2    pantoprazole (PROTONIX) 40 MG EC tablet, Take 1 tablet by mouth Daily., Disp: , Rfl:     lansoprazole (PREVACID) 15 MG capsule, Take 2 capsules by mouth Daily. (Patient not taking: Reported on 2024), Disp: , Rfl:     loperamide (IMODIUM) 2 MG capsule, Take 1 capsule by mouth 4 (Four) Times a Day As Needed for Diarrhea. (Patient not taking: Reported on 2024), Disp: , Rfl:     mupirocin (BACTROBAN) 2 % ointment, Apply 1 Application topically to the appropriate area as directed 3 (Three) Times a Day. (Patient not taking: Reported on 2024), Disp: 15 g, Rfl: 0    nicotine (NICODERM CQ) 14 MG/24HR patch, Place 1 patch on the skin Daily. (Patient not taking: Reported on 2024), Disp: 30 patch, Rfl: 2    primidone (MYSOLINE) 50 MG tablet, Take 1 tablet by mouth Take As Directed. (Patient not taking: Reported on 2024), Disp: , Rfl:     raNITIdine (ZANTAC) 150 MG tablet, TAKE 1 TABLET BY MOUTH DAILY (Patient not taking: Reported on 2024), Disp: 30 tablet, Rfl: 2    The following portions of the patient's history were reviewed and updated as appropriate: allergies, current medications, past family history, past medical history, past social history, past surgical history and problem list.    Social History     Tobacco Use    Smoking status: Every Day     Current packs/day: 0.00     Average packs/day: 1 pack/day for 35.0 years (35.0 ttl pk-yrs)     Types: Cigarettes     Start date: 1983     Last attempt to quit: 2018     Years since quittin.5    Smokeless tobacco: Never   Vaping Use    Vaping status: Never Used   Substance Use Topics    Alcohol use: No    Drug use: No         Objective   Vitals:    24 1503   BP: 108/70   Pulse: 80   SpO2: 96%   Weight: 93.9 kg (207 lb)   Height: 177.8 cm (70\")     Body mass index is 29.7 kg/m².    ROS    Constitutional:       " "General: Not in acute distress.     Appearance: Healthy appearance. Well-developed and not in distress. Not diaphoretic.   Eyes:      Conjunctiva/sclera: Conjunctivae normal.      Pupils: Pupils are equal, round, and reactive to light.   HENT:      Head: Normocephalic and atraumatic.   Neck:      Vascular: No carotid bruit or JVD.   Pulmonary:      Effort: Pulmonary effort is normal. No respiratory distress.      Breath sounds: Normal breath sounds.   Cardiovascular:      Normal rate. Regular rhythm.   Edema:     Peripheral edema absent.   Skin:     General: Skin is cool.   Neurological:      Mental Status: Alert, oriented to person, place, and time and oriented to person, place and time.         Lab Results   Component Value Date     (L) 11/01/2017    K 3.9 11/01/2017     11/01/2017    CO2 23.4 (L) 11/01/2017    BUN 14 11/01/2017    CREATININE 0.81 11/01/2017    GLUCOSE 195 (H) 11/01/2017    CALCIUM 9.7 11/01/2017    AST 22 11/01/2017    ALT 36 11/01/2017    ALKPHOS 133 (H) 11/01/2017     Lab Results   Component Value Date    CKTOTAL 101 07/25/2016     Lab Results   Component Value Date    WBC 18.80 (H) 11/01/2017    HGB 14.7 11/01/2017    HCT 42.8 11/01/2017     11/01/2017     No results found for: \"INR\"  No results found for: \"MG\"  Lab Results   Component Value Date    TRIG 162 (H) 06/12/2017    HDL 33 (L) 06/12/2017    LDL 39 06/12/2017      Lab Results   Component Value Date    BNP 18.0 07/25/2016       During this visit the following were done:  Labs Reviewed []    Labs Ordered []    Radiology Reports Reviewed []    Radiology Ordered []    PCP Records Reviewed []    Referring Provider Records Reviewed []    ER Records Reviewed []    Hospital Records Reviewed []    History Obtained From Family []    Radiology Images Reviewed []    Other Reviewed []    Records Requested []         ECG 12 Lead    Date/Time: 8/29/2024 3:04 PM  Performed by: Ivan Jaimes PA-C    Authorized by: Ivan Jaimes " SHEY VALERIO  Comparison: compared with previous ECG   Similar to previous ECG  Rhythm: sinus rhythm  ST Segments: ST segments normal    Clinical impression: normal ECG        Assessment & Plan    Diagnosis Plan   1. ASCVD (arteriosclerotic cardiovascular disease), status post stenting of the left circumflex with 2.5×8 mm Alpine stent on 7/27/2016.        2. Dyslipidemia                 Recommendations:  ASCVD  Denies any anginal symptoms continue with GDMT with aspirin, Lipitor, Plavix, and metoprolol.  Will request recent labs from PCP last year cholesterol was well-controlled.  Lower extremity pain  Unsure of etiology he reportedly had arterial Doppler performed already which was unremarkable.  I was able to palpate dorsalis pedis pulses bilaterally.  Dyslipidemia  Continue Lipitor for now cholesterol was controlled at last check will request more recent labs from PCP    Return in about 1 year (around 8/29/2025).    As always, I appreciate very much the opportunity to participate in the cardiovascular care of your patients.      With Best Regards,    Ivan Jaimes PA-C

## 2024-08-30 ENCOUNTER — TELEPHONE (OUTPATIENT)
Dept: CARDIOLOGY | Facility: CLINIC | Age: 64
End: 2024-08-30
Payer: COMMERCIAL

## 2024-08-30 NOTE — TELEPHONE ENCOUNTER
Requested.       ----- Message from Ivan Jaimes sent at 8/29/2024  3:19 PM EDT -----  Can we get labs from pcp

## 2024-12-06 ENCOUNTER — TELEPHONE (OUTPATIENT)
Dept: CARDIOLOGY | Facility: CLINIC | Age: 64
End: 2024-12-06
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
## 2024-12-10 NOTE — TELEPHONE ENCOUNTER
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 WONDERING IF THIS HAS BEEN FAX YET- PLEASE ADVISE.     Is it okay if the provider responds through MyChart: CALL

## 2025-07-18 ENCOUNTER — TELEPHONE (OUTPATIENT)
Dept: CARDIOLOGY | Facility: CLINIC | Age: 65
End: 2025-07-18
Payer: COMMERCIAL

## 2025-07-23 ENCOUNTER — OFFICE VISIT (OUTPATIENT)
Dept: CARDIOLOGY | Facility: CLINIC | Age: 65
End: 2025-07-23
Payer: COMMERCIAL

## 2025-07-23 VITALS
DIASTOLIC BLOOD PRESSURE: 70 MMHG | HEIGHT: 70 IN | SYSTOLIC BLOOD PRESSURE: 123 MMHG | BODY MASS INDEX: 30.21 KG/M2 | WEIGHT: 211 LBS | HEART RATE: 69 BPM | OXYGEN SATURATION: 98 %

## 2025-07-23 DIAGNOSIS — I25.10 ASCVD (ARTERIOSCLEROTIC CARDIOVASCULAR DISEASE): Primary | ICD-10-CM

## 2025-07-23 DIAGNOSIS — E78.5 DYSLIPIDEMIA: ICD-10-CM

## 2025-07-23 PROCEDURE — 99214 OFFICE O/P EST MOD 30 MIN: CPT | Performed by: PHYSICIAN ASSISTANT

## 2025-07-23 PROCEDURE — 93000 ELECTROCARDIOGRAM COMPLETE: CPT | Performed by: PHYSICIAN ASSISTANT

## 2025-07-23 NOTE — PROGRESS NOTES
Sandra Mckeon APRN  Yury Thibodeaux  1960 07/23/2025    Patient Active Problem List   Diagnosis    Precordial pain    Dyslipidemia    Palpitations    ASCVD (arteriosclerotic cardiovascular disease), status post stenting of the left circumflex with 2.5×8 mm Alpine stent on 7/27/2016.    Renal carcinoma, right, status post right partial nephrectomy in November 2017.       Dear Sandra Mckeon APRN:    Subjective     History of Present Illness:    Chief Complaint   Patient presents with    Follow-up     Pt states no complaints of cardiac symptoms    cardiac clearance       Yury Thibodeaux is a pleasant 64 y.o. 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    He reports no symptoms of angina, palpitations, or syncope. Today's blood pressure readings are within normal limits. He has been advised that cardiac clearance is necessary prior to undergoing the DOT physical examination. Currently, he does not require any medication refills.    On March 19, 2025, the patient sustained a lumbar spine fracture secondary to a fall from a roof, which subsequently healed without the need for surgical intervention.    SOCIAL HISTORY  Occupations:        No Known Allergies:      Current Outpatient Medications:     aspirin 81 MG EC tablet, Take 1 tablet by mouth Daily., Disp: 90 tablet, Rfl: 2    atorvastatin (LIPITOR) 80 MG tablet, Take 1 tablet by mouth Daily., Disp: 90 tablet, Rfl: 3    clopidogrel (PLAVIX) 75 MG tablet, Take 1 tablet by mouth Daily., Disp: 90 tablet, Rfl: 2    loperamide (IMODIUM) 2 MG capsule, Take 1 capsule by mouth 4 (Four) Times a Day As Needed for Diarrhea., Disp: , Rfl:     metFORMIN (GLUCOPHAGE) 1000 MG tablet, Take 0.5 tablets by mouth 2 (Two) Times a Day With Meals., Disp: , Rfl:      metoprolol tartrate (LOPRESSOR) 25 MG tablet, Take 1 tablet by mouth 2 (Two) Times a Day., Disp: 180 tablet, Rfl: 2    pantoprazole (PROTONIX) 40 MG EC tablet, Take 1 tablet by mouth Daily., Disp: , Rfl:     glipiZIDE (GLUCOTROL) 5 MG tablet, Take 2 tablets by mouth 2 (Two) Times a Day Before Meals. (Patient not taking: Reported on 7/23/2025), Disp: , Rfl:     HYDROcodone-acetaminophen (NORCO)  MG per tablet, Take 1 tablet by mouth Every 6 (Six) Hours As Needed for Moderate Pain. (Patient not taking: Reported on 7/23/2025), Disp: , Rfl:     lansoprazole (PREVACID) 15 MG capsule, Take 2 capsules by mouth Daily. (Patient not taking: Reported on 8/29/2024), Disp: , Rfl:     mupirocin (BACTROBAN) 2 % ointment, Apply 1 Application topically to the appropriate area as directed 3 (Three) Times a Day. (Patient not taking: Reported on 7/23/2025), Disp: 15 g, Rfl: 0    nicotine (NICODERM CQ) 14 MG/24HR patch, Place 1 patch on the skin Daily. (Patient not taking: Reported on 7/23/2025), Disp: 30 patch, Rfl: 2    primidone (MYSOLINE) 50 MG tablet, Take 1 tablet by mouth Take As Directed. (Patient not taking: Reported on 7/23/2025), Disp: , Rfl:     raNITIdine (ZANTAC) 150 MG tablet, TAKE 1 TABLET BY MOUTH DAILY (Patient not taking: Reported on 7/23/2025), Disp: 30 tablet, Rfl: 2    The following portions of the patient's history were reviewed and updated as appropriate: allergies, current medications, past family history, past medical history, past social history, past surgical history and problem list.    Social History     Tobacco Use    Smoking status: Every Day     Current packs/day: 1.00     Average packs/day: 1 pack/day for 42.4 years (42.4 ttl pk-yrs)     Types: Cigarettes     Start date: 2/28/1983    Smokeless tobacco: Never   Vaping Use    Vaping status: Never Used   Substance Use Topics    Alcohol use: No    Drug use: No         Objective   Vitals:    07/23/25 1038   BP: 123/70   BP Location: Left arm   Patient  "Position: Sitting   Cuff Size: Adult   Pulse: 69   SpO2: 98%   Weight: 95.7 kg (211 lb)   Height: 177.8 cm (70\")     Body mass index is 30.28 kg/m².    ROS    Constitutional:       General: Not in acute distress.     Appearance: Healthy appearance. Well-developed and not in distress. Not diaphoretic.   Eyes:      Conjunctiva/sclera: Conjunctivae normal.      Pupils: Pupils are equal, round, and reactive to light.   HENT:      Head: Normocephalic and atraumatic.   Neck:      Vascular: No carotid bruit or JVD.   Pulmonary:      Effort: Pulmonary effort is normal. No respiratory distress.      Breath sounds: Normal breath sounds.   Cardiovascular:      Normal rate. Regular rhythm.   Edema:     Peripheral edema absent.   Skin:     General: Skin is cool.   Neurological:      Mental Status: Alert, oriented to person, place, and time and oriented to person, place and time.         Lab Results   Component Value Date     (L) 11/01/2017    K 3.9 11/01/2017     11/01/2017    CO2 23.4 (L) 11/01/2017    BUN 14 11/01/2017    CREATININE 0.81 11/01/2017    GLUCOSE 195 (H) 11/01/2017    CALCIUM 9.7 11/01/2017    AST 22 11/01/2017    ALT 36 11/01/2017    ALKPHOS 133 (H) 11/01/2017     Lab Results   Component Value Date    CKTOTAL 101 07/25/2016     Lab Results   Component Value Date    WBC 6.7 09/28/2024    HGB 15.4 09/28/2024    HCT 46.5 09/28/2024     09/28/2024     Lab Results   Component Value Date    INR 1.09 05/03/2025     No results found for: \"MG\"  Lab Results   Component Value Date    TRIG 162 (H) 06/12/2017    HDL 33 (L) 06/12/2017    LDL 39 06/12/2017      Lab Results   Component Value Date    BNP 18.0 07/25/2016       During this visit the following were done:  Labs Reviewed []    Labs Ordered []    Radiology Reports Reviewed []    Radiology Ordered []    PCP Records Reviewed []    Referring Provider Records Reviewed []    ER Records Reviewed []    Hospital Records Reviewed []    History Obtained From " Family []    Radiology Images Reviewed []    Other Reviewed []    Records Requested []         ECG 12 Lead    Date/Time: 7/23/2025 11:14 AM  Performed by: Ivan Jaimes PA-C    Authorized by: Ivan Jaimes PA-C  Comparison: compared with previous ECG   Similar to previous ECG  Rhythm: sinus rhythm  Conduction: conduction normal  ST Segments: ST segments normal    Clinical impression: normal ECG          Assessment & Plan    Diagnosis Plan   1. ASCVD (arteriosclerotic cardiovascular disease), status post stenting of the left circumflex with 2.5×8 mm Alpine stent on 7/27/2016.  ECG 12 Lead    Lipid Panel      2. Dyslipidemia  ECG 12 Lead    Lipid Panel               Assessment & Plan  1. ASCVD:  - No anginal symptoms  - Asymptomatic from a cardiac standpoint  - Capable of performing all ADLs without restrictions  - Continue aspirin, Lipitor, Plavix, and metoprolol  - Fit for DOT requirements from a cardiovascular standpoint    2. Dyslipidemia:  - Continue atorvastatin  - Order lipid panel for next labs      No follow-ups on file.    As always, I appreciate very much the opportunity to participate in the cardiovascular care of your patients.      With Best Regards,    Ivan Jaimes PA-C    Patient or patient representative verbalized consent for the use of Ambient Listening during the visit with  Ivan Jaimes PA-C for chart documentation. 7/23/2025  11:39 EDT

## 2025-07-23 NOTE — PROGRESS NOTES
Sandra Mckeon APRN  Yury Thibodeaux  1960 07/23/2025    Patient Active Problem List   Diagnosis    Precordial pain    Dyslipidemia    Palpitations    ASCVD (arteriosclerotic cardiovascular disease), status post stenting of the left circumflex with 2.5×8 mm Alpine stent on 7/27/2016.    Renal carcinoma, right, status post right partial nephrectomy in November 2017.       Dear Sandra Mckeon APRN:    Subjective     History of Present Illness:    Chief Complaint   Patient presents with    Follow-up     Pt states no complaints of cardiac symptoms       Yury Thibodeaux is a pleasant 64 y.o. male with a past medical history significant for  ASCVD with stenting of LCX in July 2016. He also has diabetes mellitus, essential hypertension. He also smokes tobacco. He comes in for cardiology follow up.    History of Present Illness         No Known Allergies:      Current Outpatient Medications:     aspirin 81 MG EC tablet, Take 1 tablet by mouth Daily., Disp: 90 tablet, Rfl: 2    atorvastatin (LIPITOR) 80 MG tablet, Take 1 tablet by mouth Daily., Disp: 90 tablet, Rfl: 3    clopidogrel (PLAVIX) 75 MG tablet, Take 1 tablet by mouth Daily., Disp: 90 tablet, Rfl: 2    loperamide (IMODIUM) 2 MG capsule, Take 1 capsule by mouth 4 (Four) Times a Day As Needed for Diarrhea., Disp: , Rfl:     metFORMIN (GLUCOPHAGE) 1000 MG tablet, Take 0.5 tablets by mouth 2 (Two) Times a Day With Meals., Disp: , Rfl:     metoprolol tartrate (LOPRESSOR) 25 MG tablet, Take 1 tablet by mouth 2 (Two) Times a Day., Disp: 180 tablet, Rfl: 2    pantoprazole (PROTONIX) 40 MG EC tablet, Take 1 tablet by mouth Daily., Disp: , Rfl:     glipiZIDE (GLUCOTROL) 5 MG tablet, Take 2 tablets by mouth 2 (Two) Times a Day Before Meals. (Patient not taking: Reported on 7/23/2025), Disp: , Rfl:     HYDROcodone-acetaminophen (NORCO)  MG per tablet, Take 1 tablet by mouth Every 6 (Six) Hours As Needed for Moderate Pain. (Patient not taking: Reported on  "7/23/2025), Disp: , Rfl:     lansoprazole (PREVACID) 15 MG capsule, Take 2 capsules by mouth Daily. (Patient not taking: Reported on 8/29/2024), Disp: , Rfl:     mupirocin (BACTROBAN) 2 % ointment, Apply 1 Application topically to the appropriate area as directed 3 (Three) Times a Day. (Patient not taking: Reported on 7/23/2025), Disp: 15 g, Rfl: 0    nicotine (NICODERM CQ) 14 MG/24HR patch, Place 1 patch on the skin Daily. (Patient not taking: Reported on 7/23/2025), Disp: 30 patch, Rfl: 2    primidone (MYSOLINE) 50 MG tablet, Take 1 tablet by mouth Take As Directed. (Patient not taking: Reported on 7/23/2025), Disp: , Rfl:     raNITIdine (ZANTAC) 150 MG tablet, TAKE 1 TABLET BY MOUTH DAILY (Patient not taking: Reported on 7/23/2025), Disp: 30 tablet, Rfl: 2    The following portions of the patient's history were reviewed and updated as appropriate: allergies, current medications, past family history, past medical history, past social history, past surgical history and problem list.    Social History     Tobacco Use    Smoking status: Every Day     Current packs/day: 1.00     Average packs/day: 1 pack/day for 42.4 years (42.4 ttl pk-yrs)     Types: Cigarettes     Start date: 2/28/1983    Smokeless tobacco: Never   Vaping Use    Vaping status: Never Used   Substance Use Topics    Alcohol use: No    Drug use: No         Objective   Vitals:    07/23/25 1038   BP: 123/70   BP Location: Left arm   Patient Position: Sitting   Cuff Size: Adult   Pulse: 69   SpO2: 98%   Weight: 95.7 kg (211 lb)   Height: 177.8 cm (70\")     Body mass index is 30.28 kg/m².    ROS    Physical Exam    Lab Results   Component Value Date     (L) 11/01/2017    K 3.9 11/01/2017     11/01/2017    CO2 23.4 (L) 11/01/2017    BUN 14 11/01/2017    CREATININE 0.81 11/01/2017    GLUCOSE 195 (H) 11/01/2017    CALCIUM 9.7 11/01/2017    AST 22 11/01/2017    ALT 36 11/01/2017    ALKPHOS 133 (H) 11/01/2017     Lab Results   Component Value Date    " "CKTOTAL 101 07/25/2016     Lab Results   Component Value Date    WBC 6.7 09/28/2024    HGB 15.4 09/28/2024    HCT 46.5 09/28/2024     09/28/2024     Lab Results   Component Value Date    INR 1.09 05/03/2025     No results found for: \"MG\"  Lab Results   Component Value Date    TRIG 162 (H) 06/12/2017    HDL 33 (L) 06/12/2017    LDL 39 06/12/2017      Lab Results   Component Value Date    BNP 18.0 07/25/2016       During this visit the following were done:  Labs Reviewed []    Labs Ordered []    Radiology Reports Reviewed []    Radiology Ordered []    PCP Records Reviewed []    Referring Provider Records Reviewed []    ER Records Reviewed []    Hospital Records Reviewed []    History Obtained From Family []    Radiology Images Reviewed []    Other Reviewed []    Records Requested []       Procedures    Assessment & Plan   No diagnosis found.         Assessment & Plan        No follow-ups on file.    As always, I appreciate very much the opportunity to participate in the cardiovascular care of your patients.      With Best Regards,    Ivan Jaimes PA-C    Patient or patient representative verbalized consent for the use of Ambient Listening during the visit with  Ivan Jaimes PA-C for chart documentation. 7/23/2025  11:40 EDT       "

## 2025-07-25 NOTE — ED NOTES
Called [facility] for Dr. Keegan Nettles  11/01/17 [id number]     Labs have been ordered.  Please get them fasting prior to the appt.  thanks